# Patient Record
Sex: MALE | Race: BLACK OR AFRICAN AMERICAN
[De-identification: names, ages, dates, MRNs, and addresses within clinical notes are randomized per-mention and may not be internally consistent; named-entity substitution may affect disease eponyms.]

---

## 2017-03-30 ENCOUNTER — HOSPITAL ENCOUNTER (INPATIENT)
Dept: HOSPITAL 61 - ER | Age: 52
LOS: 1 days | Discharge: HOME | DRG: 189 | End: 2017-03-31
Attending: INTERNAL MEDICINE | Admitting: INTERNAL MEDICINE
Payer: SELF-PAY

## 2017-03-30 VITALS — SYSTOLIC BLOOD PRESSURE: 128 MMHG | DIASTOLIC BLOOD PRESSURE: 78 MMHG

## 2017-03-30 VITALS — BODY MASS INDEX: 20.03 KG/M2 | WEIGHT: 143.06 LBS | HEIGHT: 71 IN

## 2017-03-30 VITALS — DIASTOLIC BLOOD PRESSURE: 91 MMHG | SYSTOLIC BLOOD PRESSURE: 129 MMHG

## 2017-03-30 DIAGNOSIS — K21.9: ICD-10-CM

## 2017-03-30 DIAGNOSIS — R07.89: ICD-10-CM

## 2017-03-30 DIAGNOSIS — Z82.49: ICD-10-CM

## 2017-03-30 DIAGNOSIS — J30.9: ICD-10-CM

## 2017-03-30 DIAGNOSIS — I10: ICD-10-CM

## 2017-03-30 DIAGNOSIS — E83.42: ICD-10-CM

## 2017-03-30 DIAGNOSIS — R19.7: ICD-10-CM

## 2017-03-30 DIAGNOSIS — Q89.9: ICD-10-CM

## 2017-03-30 DIAGNOSIS — J96.20: Primary | ICD-10-CM

## 2017-03-30 DIAGNOSIS — F41.9: ICD-10-CM

## 2017-03-30 DIAGNOSIS — K52.9: ICD-10-CM

## 2017-03-30 DIAGNOSIS — F17.200: ICD-10-CM

## 2017-03-30 DIAGNOSIS — M19.90: ICD-10-CM

## 2017-03-30 DIAGNOSIS — E87.6: ICD-10-CM

## 2017-03-30 LAB
ALBUMIN SERPL-MCNC: 3.8 G/DL (ref 3.4–5)
ALP SERPL-CCNC: 96 U/L (ref 46–116)
ALT SERPL-CCNC: 15 U/L (ref 16–63)
ANION GAP SERPL CALC-SCNC: 12 MMOL/L (ref 6–14)
AST SERPL-CCNC: 19 U/L (ref 15–37)
BACTERIA #/AREA URNS HPF: 0 /HPF
BARBITURATES UR-MCNC: (no result) UG/ML
BASOPHILS # BLD AUTO: 0 X10^3/UL (ref 0–0.2)
BASOPHILS NFR BLD: 1 % (ref 0–3)
BENZODIAZ UR-MCNC: (no result) UG/L
BILIRUB DIRECT SERPL-MCNC: 0.1 MG/DL (ref 0–0.2)
BILIRUB SERPL-MCNC: 0.3 MG/DL (ref 0.2–1)
BILIRUB UR QL STRIP: (no result)
BUN SERPL-MCNC: 10 MG/DL (ref 8–26)
CALCIUM SERPL-MCNC: 10 MG/DL (ref 8.5–10.1)
CANNABINOIDS UR-MCNC: (no result) UG/L
CHLORIDE SERPL-SCNC: 101 MMOL/L (ref 98–107)
CK SERPL-CCNC: 78 U/L (ref 39–308)
CKMB INDEX: 0.6 % (ref 0–4)
CKMB MASS: < 0.5 NG/ML (ref 0–3.6)
CO2 SERPL-SCNC: 30 MMOL/L (ref 21–32)
COCAINE UR-MCNC: (no result) NG/ML
CREAT SERPL-MCNC: 1 MG/DL (ref 0.7–1.3)
EOSINOPHIL NFR BLD: 0 % (ref 0–3)
ERYTHROCYTE [DISTWIDTH] IN BLOOD BY AUTOMATED COUNT: 14.6 % (ref 11.5–14.5)
ETHANOL, URINE: (no result)
GFR SERPLBLD BASED ON 1.73 SQ M-ARVRAT: 94.9 ML/MIN
GLUCOSE SERPL-MCNC: 125 MG/DL (ref 70–99)
GLUCOSE UR STRIP-MCNC: NEGATIVE MG/DL
HCT VFR BLD CALC: 48.7 % (ref 39–53)
HGB BLD-MCNC: 16.5 G/DL (ref 13–17.5)
LYMPHOCYTES # BLD: 1 X10^3/UL (ref 1–4.8)
LYMPHOCYTES NFR BLD AUTO: 15 % (ref 24–48)
MAGNESIUM SERPL-MCNC: 1.7 MG/DL (ref 1.8–2.4)
MCH RBC QN AUTO: 32 PG (ref 25–35)
MCHC RBC AUTO-ENTMCNC: 34 G/DL (ref 31–37)
MCV RBC AUTO: 95 FL (ref 79–100)
METHADONE SERPL-MCNC: (no result) NG/ML
MONOCYTES NFR BLD: 7 % (ref 0–9)
NEUTROPHILS NFR BLD AUTO: 77 % (ref 31–73)
NITRITE UR QL STRIP: NEGATIVE
OPIATES UR-MCNC: (no result) NG/ML
PCP SERPL-MCNC: (no result) MG/DL
PH UR STRIP: 6 [PH]
PLATELET # BLD AUTO: 218 X10^3/UL (ref 140–400)
POTASSIUM SERPL-SCNC: 3.5 MMOL/L (ref 3.5–5.1)
PROT SERPL-MCNC: 7.5 G/DL (ref 6.4–8.2)
PROT UR STRIP-MCNC: 30 MG/DL
RBC # BLD AUTO: 5.15 X10^6/UL (ref 4.3–5.7)
RBC #/AREA URNS HPF: 0 /HPF (ref 0–2)
SODIUM SERPL-SCNC: 143 MMOL/L (ref 136–145)
SP GR UR STRIP: >=1.03
SQUAMOUS #/AREA URNS LPF: (no result) /LPF
UROBILINOGEN UR-MCNC: 1 MG/DL
WBC # BLD AUTO: 7 X10^3/UL (ref 4–11)
WBC #/AREA URNS HPF: (no result) /HPF (ref 0–4)

## 2017-03-30 PROCEDURE — 84484 ASSAY OF TROPONIN QUANT: CPT

## 2017-03-30 PROCEDURE — 81001 URINALYSIS AUTO W/SCOPE: CPT

## 2017-03-30 PROCEDURE — 96374 THER/PROPH/DIAG INJ IV PUSH: CPT

## 2017-03-30 PROCEDURE — 80048 BASIC METABOLIC PNL TOTAL CA: CPT

## 2017-03-30 PROCEDURE — 80076 HEPATIC FUNCTION PANEL: CPT

## 2017-03-30 PROCEDURE — 71010: CPT

## 2017-03-30 PROCEDURE — 83690 ASSAY OF LIPASE: CPT

## 2017-03-30 PROCEDURE — 87086 URINE CULTURE/COLONY COUNT: CPT

## 2017-03-30 PROCEDURE — 82553 CREATINE MB FRACTION: CPT

## 2017-03-30 PROCEDURE — 85027 COMPLETE CBC AUTOMATED: CPT

## 2017-03-30 PROCEDURE — 83735 ASSAY OF MAGNESIUM: CPT

## 2017-03-30 PROCEDURE — 96375 TX/PRO/DX INJ NEW DRUG ADDON: CPT

## 2017-03-30 PROCEDURE — 83880 ASSAY OF NATRIURETIC PEPTIDE: CPT

## 2017-03-30 PROCEDURE — 80061 LIPID PANEL: CPT

## 2017-03-30 PROCEDURE — 36415 COLL VENOUS BLD VENIPUNCTURE: CPT

## 2017-03-30 PROCEDURE — 93005 ELECTROCARDIOGRAM TRACING: CPT

## 2017-03-30 RX ADMIN — BACITRACIN SCH MLS/HR: 5000 INJECTION, POWDER, FOR SOLUTION INTRAMUSCULAR at 22:27

## 2017-03-30 NOTE — PHYS DOC
Past Medical History


Past Medical History:  No Pertinent History


Past Surgical History:  Other


Additional Past Surgical Histo:  Inguinal hernia R)


Alcohol Use:  Heavy


Drug Use:  None





Adult General


Chief Complaint


Chief Complaint:  CHEST PAIN





HPI


HPI


Patient is a 52  year old male who presents with complaint of chest pain. 

Patient states he started having intermittent chest pain last night, however he 

started getting constant pain earlier this morning that has persisted 

throughout the day. Patient states that the pain is left-sided, pressure-like, 

and radiates through his chest into his back. Patient has had associated nausea 

with his symptoms. Patient has no known health problems but states that he has 

a greater than 20-pack-year history of smoking. Patient also admits to family 

history of myocardial infarction in his mother. Patient currently rates his 

pain as 10 out of 10. Patient has not taken any medications to help with his 

symptoms at this time. Patient denies any known exacerbating factors, stating 

that "everything makes it hurt." Patient denies any recent cardiac stress 

testing. Patient does not have a primary physician at this time.





Review of Systems


Review of Systems





Constitutional: Denies fever or chills []


Eyes: Denies change in visual acuity, redness, or eye pain []


HENT: Denies nasal congestion or sore throat []


Respiratory: Denies cough or shortness of breath []


Cardiovascular: Chest pain []


GI: Nausea, denies abdominal pain, vomiting, bloody stools or diarrhea []


: Denies dysuria or hematuria []


Musculoskeletal: Denies back pain or joint pain []


Integument: Denies rash or skin lesions []


Neurologic: Denies headache, focal weakness or sensory changes []





Current Medications


Current Medications





 Current Medications








 Medications


  (Trade)  Dose


 Ordered  Sig/Aravind  Start Time


 Stop Time Status Last Admin


Dose Admin


 


 Aspirin


  (Children'S


 Aspirin)  324 mg  1X  ONCE  3/30/17 16:30


 3/30/17 16:31 DC 3/30/17 17:00


324 MG


 


 Fentanyl Citrate


 50 mcg  50 mcg  PRN Q15MIN  PRN  3/30/17 16:30


 3/31/17 16:29  3/30/17 17:04


50 MCG


 


 Nitroglycerin


  (Nitrostat)  0.4 mg  PRN Q5MIN  PRN  3/30/17 16:30


 3/31/17 16:29   


 


 


 Ondansetron HCl


  (Zofran)  4 mg  1X  ONCE  3/30/17 16:30


 3/30/17 16:31 DC 3/30/17 17:02


4 MG


 


 Sodium Chloride


  (Iv Sodium


 Chloride 0.9%


 1000ml Bag)  1,000 ml @ 


 100 mls/hr  Q10H  3/30/17 16:25


 3/31/17 02:24  3/30/17 17:00


100 MLS/HR











Allergies


Allergies





 Allergies








Coded Allergies Type Severity Reaction Last Updated Verified


 


  No Known Drug Allergies    7/8/15 No











Physical Exam


Physical Exam





Constitutional: Alert, afebrile, appears in moderate discomfort. []


HENT: Normocephalic, atraumatic, bilateral external ears normal, oropharynx 

moist, no oral exudates, nose normal. []


Eyes: PERRLA, EOMI, conjunctiva normal, no discharge. [] 


Neck: Normal range of motion, no tenderness, supple, no stridor. [] 


Cardiovascular:Heart rate regular rhythm, no murmur []


Lungs & Thorax:  Bilateral breath sounds clear to auscultation []


Abdomen: Bowel sounds normal, soft, no tenderness, no masses, no pulsatile 

masses. [] 


Skin: Warm, dry, no erythema, no rash. [] 


Back: No tenderness, no CVA tenderness. [] 


Extremities: No tenderness, no cyanosis, no clubbing, ROM intact, no edema. [] 


Neurologic: Alert and oriented X 3, normal motor function, normal sensory 

function, no focal deficits noted. []





Current Patient Data


Vital Signs





 Vital Signs








  Date Time  Temp Pulse Resp B/P Pulse Ox O2 Delivery O2 Flow Rate FiO2


 


3/30/17 17:55  60 24 129/78 97 Nasal Cannula 1 


 


3/30/17 16:10 98.3       





 98.3       








Lab Values





 Laboratory Tests








Test


  3/30/17


16:35 3/30/17


17:25


 


White Blood Count


  7.0x10^3/uL


(4.0-11.0) 


 


 


Red Blood Count


  5.15x10^6/uL


(4.30-5.70) 


 


 


Hemoglobin


  16.5g/dL


(13.0-17.5) 


 


 


Hematocrit


  48.7%


(39.0-53.0) 


 


 


Mean Corpuscular Volume 95fL ()   


 


Mean Corpuscular Hemoglobin 32pg (25-35)   


 


Mean Corpuscular Hemoglobin


Concent 34g/dL (31-37)


  


 


 


Red Cell Distribution Width


  14.6%


(11.5-14.5)  H 


 


 


Platelet Count


  218x10^3/uL


(140-400) 


 


 


Neutrophils (%) (Auto) 77% (31-73)  H 


 


Lymphocytes (%) (Auto) 15% (24-48)  L 


 


Monocytes (%) (Auto) 7% (0-9)   


 


Eosinophils (%) (Auto) 0% (0-3)   


 


Basophils (%) (Auto) 1% (0-3)   


 


Neutrophils # (Auto)


  5.3x10^3uL


(1.8-7.7) 


 


 


Lymphocytes # (Auto)


  1.0x10^3/uL


(1.0-4.8) 


 


 


Monocytes # (Auto)


  0.5x10^3/uL


(0.0-1.1) 


 


 


Eosinophils # (Auto)


  0.0x10^3/uL


(0.0-0.7) 


 


 


Basophils # (Auto)


  0.0x10^3/uL


(0.0-0.2) 


 


 


Sodium Level


  143mmol/L


(136-145) 


 


 


Potassium Level


  3.5mmol/L


(3.5-5.1) 


 


 


Chloride Level


  101mmol/L


() 


 


 


Carbon Dioxide Level


  30mmol/L


(21-32) 


 


 


Anion Gap 12 (6-14)   


 


Blood Urea Nitrogen


  10mg/dL (8-26)


  


 


 


Creatinine


  1.0mg/dL


(0.7-1.3) 


 


 


Estimated GFR


(Cockcroft-Gault) 94.9  


  


 


 


Glucose Level


  125mg/dL


(70-99)  H 


 


 


Calcium Level


  10.0mg/dL


(8.5-10.1) 


 


 


Magnesium Level


  1.7mg/dL


(1.8-2.4)  L 


 


 


Total Bilirubin


  0.3mg/dL


(0.2-1.0) 


 


 


Direct Bilirubin


  0.1mg/dL


(0.0-0.2) 


 


 


Aspartate Amino Transferase


(AST) 19U/L (15-37)  


  


 


 


Alanine Aminotransferase (ALT)


  15U/L (16-63)


L 


 


 


Alkaline Phosphatase


  96U/L ()


  


 


 


Creatine Kinase


  78U/L ()


  


 


 


Creatine Kinase MB (Mass)


  < 0.5ng/mL


(0.0-3.6) 


 


 


Creatine Kinase MB Relative


Index 0.6% (0-4)  


  


 


 


Troponin I Quantitative


  < 0.017ng/mL


(0.000-0.055) 


 


 


NT-Pro-B-Type Natriuretic


Peptide 91pg/mL


(0-124) 


 


 


Total Protein


  7.5g/dL


(6.4-8.2) 


 


 


Albumin


  3.8g/dL


(3.4-5.0) 


 


 


Lipase


  77U/L ()


  


 


 


Urine Collection Type  Unknown  


 


Urine Color  Love  


 


Urine Clarity  Clear  


 


Urine pH  6.0  


 


Urine Specific Gravity  >=1.030  


 


Urine Protein


  


  30mg/dL


(NEG-TRACE)


 


Urine Glucose (UA)


  


  Negativemg/dL


(NEG)


 


Urine Ketones (Stick)  15mg/dL (NEG)  


 


Urine Blood


  


  Negative (NEG)


 


 


Urine Nitrite


  


  Negative (NEG)


 


 


Urine Bilirubin  Small (NEG)  


 


Urine Urobilinogen Dipstick


  


  1.0mg/dL (0.2


mg/dL)


 


Urine Leukocyte Esterase  Small (NEG)  


 


Urine RBC  0/HPF (0-2)  


 


Urine WBC  1-4/HPF (0-4)  


 


Urine Squamous Epithelial


Cells 


  Occ/LPF  


 


 


Urine Bacteria  0/HPF (0-FEW)  


 


Urine Mucus  Marked/LPF  


 


Urine Opiates Screen  Pos (NEG)  


 


Urine Methadone Screen  Neg (NEG)  


 


Urine Barbiturates  Neg (NEG)  


 


Urine Phencyclidine Screen  Neg (NEG)  


 


Urine


Amphetamine/Methamphetamine 


  Neg (NEG)  


 


 


Urine Benzodiazepines Screen  Neg (NEG)  


 


Urine Cocaine Screen  Neg (NEG)  


 


Urine Cannabinoids Screen  Pos (NEG)  


 


Urine Ethyl Alcohol  Neg (NEG)  





 Laboratory Tests


3/30/17 16:35








 Laboratory Tests


3/30/17 16:35











EKG


EKG


Interpreted by me: Heart rate 70, sinus rhythm, rightward axis, no acute ST/T-

wave abnormalities present []





Radiology/Procedures


Radiology/Procedures





 [facility]


 [address] [place] [address]


 [phone]


 


 IMAGING REPORT





 Signed





PATIENT: DAX CURTIS ACCOUNT: [id number] MRN#: [id number]


: 1965 LOCATION: ER AGE: 52


SEX: M EXAM DT: 17 ACCESSION#: [id number]


STATUS: PRE ER ORD. PHYSICIAN: SHIVANI VELASCO MD 


REASON: chest pain


PROCEDURE: PORTABLE CHEST 1V











Portable chest, 3/30/2017:





History: Chest pain





The heart size and pulmonary vascularity are normal. Small nodular opacities


projected over the lower chest are most likely nipple shadows. No pulmonary


infiltrates are seen. There is no evidence of pleural fluid.





IMPRESSION: No acute cardiopulmonary abnormality is detected.














DICTATED and SIGNED BY:     MORITZ,RICK S MD


DATE:     17 8659





CC: SHIVANI VELASCO MD; NO PCP ~


[]





Course & Med Decision Making


Course & Med Decision Making


Pertinent Labs and Imaging studies reviewed. (See chart for details)





The patient was treated with aspirin and fentanyl in the emergency department. 

Patient's symptoms have improved with treatment. The patient has risk factors 

for coronary artery disease and patient does not have adequate follow-up. For 

this reason the patient will need to be admitted for rule out of myocardial 

infarction. I spoke with Dr. Harris who accepted care patient in hospital. A 

consult was placed to Dr. Zepeda of cardiology to follow with patient in 

hospital.





Dragon Disclaimer


Dragon Disclaimer


This electronic medical record was generated, in whole or in part, using a 

voice recognition dictation system.





Departure


Departure


Impression:  


 Primary Impression:  


 Chest pain


 Additional Impressions:  


 Tobacco use


 Family history of myocardial infarction


Disposition:   ADMITTED AS INPATIENT


Admitting Physician:  Héctor Harris


Condition:  STABLE


Referrals:  


NO PCP (PCP)





Problem Qualifiers








 Primary Impression:  


 Chest pain


 Chest pain type:  unspecified  Qualified Code:  R07.9 - Chest pain, unspecified





SHIVAIN VELASCO MD Mar 30, 2017 16:38

## 2017-03-30 NOTE — RAD
Portable chest, 3/30/2017:



History: Chest pain



The heart size and pulmonary vascularity are normal. Small nodular opacities

projected over the lower chest are most likely nipple shadows. No pulmonary

infiltrates are seen. There is no evidence of pleural fluid.



IMPRESSION: No acute cardiopulmonary abnormality is detected.

## 2017-03-31 VITALS — DIASTOLIC BLOOD PRESSURE: 78 MMHG | SYSTOLIC BLOOD PRESSURE: 130 MMHG

## 2017-03-31 VITALS — SYSTOLIC BLOOD PRESSURE: 113 MMHG | DIASTOLIC BLOOD PRESSURE: 73 MMHG

## 2017-03-31 VITALS — DIASTOLIC BLOOD PRESSURE: 69 MMHG | SYSTOLIC BLOOD PRESSURE: 123 MMHG

## 2017-03-31 LAB
ANION GAP SERPL CALC-SCNC: 14 MMOL/L (ref 6–14)
BASOPHILS # BLD AUTO: 0 X10^3/UL (ref 0–0.2)
BASOPHILS NFR BLD: 1 % (ref 0–3)
BUN SERPL-MCNC: 10 MG/DL (ref 8–26)
CALCIUM SERPL-MCNC: 9.1 MG/DL (ref 8.5–10.1)
CHLORIDE SERPL-SCNC: 103 MMOL/L (ref 98–107)
CHOLEST SERPL-MCNC: 170 MG/DL (ref 0–200)
CHOLEST/HDLC SERPL: 3.3 {RATIO}
CO2 SERPL-SCNC: 26 MMOL/L (ref 21–32)
CREAT SERPL-MCNC: 0.9 MG/DL (ref 0.7–1.3)
EOSINOPHIL NFR BLD: 1 % (ref 0–3)
ERYTHROCYTE [DISTWIDTH] IN BLOOD BY AUTOMATED COUNT: 15 % (ref 11.5–14.5)
GFR SERPLBLD BASED ON 1.73 SQ M-ARVRAT: 107.2 ML/MIN
GLUCOSE SERPL-MCNC: 104 MG/DL (ref 70–99)
HCT VFR BLD CALC: 45.3 % (ref 39–53)
HDLC SERPL-MCNC: 51 MG/DL (ref 40–60)
HGB BLD-MCNC: 14.9 G/DL (ref 13–17.5)
LYMPHOCYTES # BLD: 1.8 X10^3/UL (ref 1–4.8)
LYMPHOCYTES NFR BLD AUTO: 33 % (ref 24–48)
MAGNESIUM SERPL-MCNC: 1.9 MG/DL (ref 1.8–2.4)
MCH RBC QN AUTO: 32 PG (ref 25–35)
MCHC RBC AUTO-ENTMCNC: 33 G/DL (ref 31–37)
MCV RBC AUTO: 97 FL (ref 79–100)
MONOCYTES NFR BLD: 9 % (ref 0–9)
NEUTROPHILS NFR BLD AUTO: 57 % (ref 31–73)
PLATELET # BLD AUTO: 196 X10^3/UL (ref 140–400)
POTASSIUM SERPL-SCNC: 3.1 MMOL/L (ref 3.5–5.1)
RBC # BLD AUTO: 4.68 X10^6/UL (ref 4.3–5.7)
SODIUM SERPL-SCNC: 143 MMOL/L (ref 136–145)
TRIGL SERPL-MCNC: 116 MG/DL (ref 0–150)
WBC # BLD AUTO: 5.4 X10^3/UL (ref 4–11)

## 2017-03-31 RX ADMIN — POTASSIUM CHLORIDE SCH MEQ: 1500 TABLET, EXTENDED RELEASE ORAL at 12:00

## 2017-03-31 RX ADMIN — BACITRACIN SCH MLS/HR: 5000 INJECTION, POWDER, FOR SOLUTION INTRAMUSCULAR at 03:02

## 2017-03-31 NOTE — PDOC2
PARTH CAMACHO APRN 3/31/17 0903:


CARDIAC CONSULT


DATE OF CONSULT


Date of Consult


DATE: 3/31/17 


TIME: 08:55





REASON FOR CONSULT


Reason for Consult:


Chest pain





REFERRING PHYSICIAN


Referring Physician:


Rena





SOURCE


Source:  Chart review, Patient





HISTORY OF PRESENT ILLNESS


HISTORY OF PRESENT ILLNESS


This is a 51 yo male admitted for complains of chest pain.  Reports as sharp 

then pressure to right chest that radiated to mid back.  This started 2 days 

ago 8PM which then he felt slightly nauseated as well and slept it off.  The 

next day he was doing well and decided to lift about 100 lb box and felt chest 

discomfort and was nauseated as well. His nausea resolved but CP was 

intermittent throughout the day. He then started having diarrhea, 3 bouts of 

diarrhea yesterday 1 was watery then x2 loose. Denies any fever or chills.  

There was no diaphoresis, palpitations, dizziness, SOA nor JAIME. No further 

recurrence of his symptoms since in ED. Also no recent falls or injury he also 

has been having almost a daily heartburn which he does not take any medications 

for it.  No daily NSAID use.  He does take sinus medications which has sudafed 

in it almost everyday for his allergies are bad. Denies any CAD, VTE in the 

past.





PAST MEDICAL HISTORY


Cardiovascular:  No pertinent hx


Pulmonary:  No pertinent hx


CENTRAL NERVOUS SYSTEM:  Other (No pertinent history)


GI:  No pertinent hx


Heme/Onc:  No pertinent hx


Hepatobiliary:  No pertinent hx


Psych:  No pertinent hx


Musculoskeletal:  Osteoarthritis


Rheumatologic:  No pertinent hx


Infectious disease:  No pertinent hx


ENT:  Allergic Rhinitis


Renal/:  No pertinent hx


Endocrine:  No pertinent hx


Dermatology:  No pertinent hx





PAST SURGICAL HISTORY


Past Surgical History:  Other (left inguinal hernia repair as a child)





FAMILY HISTORY


Family History:  Coronary Artery Disease (mother CAD in her 30s)





SOCIAL HISTORY


Smoke:  1 pack per day (>20)


ALCOHOL:  occassional


Drugs:  Marijuana


Lives:  with Family





CURRENT MEDICATIONS


CURRENT MEDICATIONS





Current Medications








 Medications


  (Trade)  Dose


 Ordered  Sig/Aravind


 Route


 PRN Reason  Start Time


 Stop Time Status Last Admin


Dose Admin


 


 Aspirin


  (Children'S


 Aspirin)  324 mg  1X  ONCE


 PO


   3/30/17 16:30


 3/30/17 16:31 DC 3/30/17 17:00


 


 


 Fentanyl Citrate


 50 mcg  50 mcg  PRN Q15MIN  PRN


 IV


 PAIN GREATER THAN 3/10  3/30/17 16:30


 3/31/17 16:29  3/30/17 17:04


 


 


 Sodium Chloride


  (Iv Sodium


 Chloride 0.9%


 1000ml Bag)  1,000 ml @ 


 100 mls/hr  Q10H


 IV


   3/30/17 16:25


 3/31/17 02:24 DC 3/30/17 17:00


 


 


 Ondansetron HCl 4


 mg  4 mg  1X  ONCE


 IV


   3/30/17 16:30


 3/30/17 16:31 DC 3/30/17 17:02


 


 


 Sodium Chloride


  (Iv Sodium


 Chloride 0.9%


 1000ml Bag)  1,000 ml @ 


 100 mls/hr  Q10H


 IV


   3/30/17 19:01


 3/31/17 19:00  3/31/17 03:02


 


 


 Morphine Sulfate  2 mg  PRN Q2HR  PRN


 IV


 SEVERE PAIN  3/30/17 22:30


    3/30/17 23:19


 











ALLERGIES


ALLERGIES:  


Coded Allergies:  


     No Known Drug Allergies (Unverified , 7/8/15)





ROS


Review of System


14 point ROS evaluated with pertinent positives noted per HPI





PHYSICAL EXAM


General:  Alert, Oriented X3, Cooperative, No acute distress


HEENT:  Atraumatic, Mucous membr. moist/pink


Lungs:  Clear to auscultation, Normal air movement


Heart:  Regular rate, Normal S1, Normal S2, Other (2/6 systolic murmur to LLS 

border)


Abdomen:  Soft, No tenderness


Extremities:  No cyanosis, No edema


Skin:  No breakdown, No significant lesion


Neuro:  Normal speech, Sensation intact


Psych/Mental Status:  Mental status NL, Mood NL


MUSCULOSKELETAL:  Osteoarthritic changes both hands





VITALS


VITALS





 Vital Signs








  Date Time  Temp Pulse Resp B/P Pulse Ox O2 Delivery O2 Flow Rate FiO2


 


3/31/17 07:00 98.3 63 20 113/73 91 Room Air  





 98.3       


 


3/30/17 23:19       1.0 











LABS


Lab:





Laboratory Tests








Test


  3/30/17


16:35 3/30/17


17:25 3/31/17


01:30 3/31/17


05:30


 


White Blood Count


  7.0x10^3/uL


(4.0-11.0) 


  


  5.4x10^3/uL


(4.0-11.0)


 


Red Blood Count


  5.15x10^6/uL


(4.30-5.70) 


  


  4.68x10^6/uL


(4.30-5.70)


 


Hemoglobin


  16.5g/dL


(13.0-17.5) 


  


  14.9g/dL


(13.0-17.5)


 


Hematocrit


  48.7%


(39.0-53.0) 


  


  45.3%


(39.0-53.0)


 


Mean Corpuscular Volume 95fL ()    97fL () 


 


Mean Corpuscular Hemoglobin 32pg (25-35)    32pg (25-35) 


 


Mean Corpuscular Hemoglobin


Concent 34g/dL (31-37) 


  


  


  33g/dL (31-37) 


 


 


Red Cell Distribution Width


  14.6%


(11.5-14.5) 


  


  15.0%


(11.5-14.5)


 


Platelet Count


  218x10^3/uL


(140-400) 


  


  196x10^3/uL


(140-400)


 


Neutrophils (%) (Auto) 77% (31-73)    57% (31-73) 


 


Lymphocytes (%) (Auto) 15% (24-48)    33% (24-48) 


 


Monocytes (%) (Auto) 7% (0-9)    9% (0-9) 


 


Eosinophils (%) (Auto) 0% (0-3)    1% (0-3) 


 


Basophils (%) (Auto) 1% (0-3)    1% (0-3) 


 


Neutrophils # (Auto)


  5.3x10^3uL


(1.8-7.7) 


  


  3.0x10^3uL


(1.8-7.7)


 


Lymphocytes # (Auto)


  1.0x10^3/uL


(1.0-4.8) 


  


  1.8x10^3/uL


(1.0-4.8)


 


Monocytes # (Auto)


  0.5x10^3/uL


(0.0-1.1) 


  


  0.5x10^3/uL


(0.0-1.1)


 


Eosinophils # (Auto)


  0.0x10^3/uL


(0.0-0.7) 


  


  0.1x10^3/uL


(0.0-0.7)


 


Basophils # (Auto)


  0.0x10^3/uL


(0.0-0.2) 


  


  0.0x10^3/uL


(0.0-0.2)


 


Sodium Level


  143mmol/L


(136-145) 


  


  143mmol/L


(136-145)


 


Potassium Level


  3.5mmol/L


(3.5-5.1) 


  


  3.1mmol/L


(3.5-5.1)


 


Chloride Level


  101mmol/L


() 


  


  103mmol/L


()


 


Carbon Dioxide Level


  30mmol/L


(21-32) 


  


  26mmol/L


(21-32)


 


Anion Gap 12 (6-14)    14 (6-14) 


 


Blood Urea Nitrogen 10mg/dL (8-26)    10mg/dL (8-26) 


 


Creatinine


  1.0mg/dL


(0.7-1.3) 


  


  0.9mg/dL


(0.7-1.3)


 


Estimated GFR


(Cockcroft-Gault) 94.9 


  


  


  107.2 


 


 


Glucose Level


  125mg/dL


(70-99) 


  


  104mg/dL


(70-99)


 


Calcium Level


  10.0mg/dL


(8.5-10.1) 


  


  9.1mg/dL


(8.5-10.1)


 


Magnesium Level


  1.7mg/dL


(1.8-2.4) 


  


  


 


 


Total Bilirubin


  0.3mg/dL


(0.2-1.0) 


  


  


 


 


Direct Bilirubin


  0.1mg/dL


(0.0-0.2) 


  


  


 


 


Aspartate Amino Transf


(AST/SGOT) 19U/L (15-37) 


  


  


  


 


 


Alanine Aminotransferase


(ALT/SGPT) 15U/L (16-63) 


  


  


  


 


 


Alkaline Phosphatase 96U/L ()    


 


Creatine Kinase 78U/L ()    


 


Creatine Kinase MB (Mass)


  < 0.5ng/mL


(0.0-3.6) 


  


  


 


 


Creatine Kinase MB Relative


Index 0.6% (0-4) 


  


  


  


 


 


Troponin I Quantitative


  < 0.017ng/mL


(0.000-0.055) 


  < 0.017ng/mL


(0.000-0.055) 


 


 


NT-Pro-B-Type Natriuretic


Peptide 91pg/mL


(0-124) 


  


  


 


 


Total Protein


  7.5g/dL


(6.4-8.2) 


  


  


 


 


Albumin


  3.8g/dL


(3.4-5.0) 


  


  


 


 


Lipase 77U/L ()    


 


Urine Collection Type  Unknown   


 


Urine Color  Love   


 


Urine Clarity  Clear   


 


Urine pH  6.0   


 


Urine Specific Gravity  >=1.030   


 


Urine Protein


  


  30mg/dL


(NEG-TRACE) 


  


 


 


Urine Glucose (UA)


  


  Negativemg/dL


(NEG) 


  


 


 


Urine Ketones (Stick)  15mg/dL (NEG)   


 


Urine Blood  Negative (NEG)   


 


Urine Nitrite  Negative (NEG)   


 


Urine Bilirubin  Small (NEG)   


 


Urine Urobilinogen Dipstick


  


  1.0mg/dL (0.2


mg/dL) 


  


 


 


Urine Leukocyte Esterase  Small (NEG)   


 


Urine RBC  0/HPF (0-2)   


 


Urine WBC  1-4/HPF (0-4)   


 


Urine Squamous Epithelial


Cells 


  Occ/LPF 


  


  


 


 


Urine Bacteria  0/HPF (0-FEW)   


 


Urine Mucus  Marked/LPF   


 


Urine Opiates Screen  Pos (NEG)   


 


Urine Methadone Screen  Neg (NEG)   


 


Urine Barbiturates  Neg (NEG)   


 


Urine Phencyclidine Screen  Neg (NEG)   


 


Urine


Amphetamine/Methamphetamine 


  Neg (NEG) 


  


  


 


 


Urine Benzodiazepines Screen  Neg (NEG)   


 


Urine Cocaine Screen  Neg (NEG)   


 


Urine Cannabinoids Screen  Pos (NEG)   


 


Urine Ethyl Alcohol  Neg (NEG)   














Test


  3/31/17


06:40 


  


  


 


 


Troponin I Quantitative


  < 0.017ng/mL


(0.000-0.055) 


  


  


 











ASSESSMENT/PLAN


ASSESSMENT/PLAN


1. Atypical chest pain: doubt ACS. Suspect GI with MSK element. 


2. Possible gastroenteritis vs GB anomaly: nausea and diarrhea, defer to PCP


3. Hypokalemia related to diarrhea, hypomagnesemia resolved. 


4. GERD exacerbation: almost daily heartburn per pt. 


5. Allergic rhinitis: daily use of Sudafed


6. Tobaccoism


7. Substance abuse: positive for opioids and marijuana





Recommendations


1. TTE today, if no significant changes then no further testing


2. Lipids, Replace K. Recommend GB sono defer to PCP. 


3. Smoking cessation, substance abstinence


4. Discouraged daily sudafed, will likely need nasal spray, defer to PCP


5. Start on PPI.


Problems:  





LAYO CALABRESE MD 3/31/17 1316:


CARDIAC CONSULT


ALLERGIES


ALLERGIES:  


Coded Allergies:  


     No Known Drug Allergies (Unverified , 7/8/15)





ASSESSMENT/PLAN


ASSESSMENT/PLAN


Patient seen and examined. Agree with NP's assessment and plan. Chest pain very 

atypical, reproducible to palpation and most probably musculoskeletal. 

Myocardial infarction ruled out. Check 2-D echo to assess LV function and rule 

out wall motion abnormalities. Thank you for consultation.


Problems:  








PARTH CAMACHO Mar 31, 2017 09:03


LAYO CALABRESE MD Mar 31, 2017 13:16

## 2017-03-31 NOTE — EKG
[facility]

               [address] [place] [address]

Test Date:    2017               Test Time:    16:21:23

Pat Name:     DAX CURTIS             Department:   

Patient ID:   [id number]           Room:         569 1

Gender:       M                        Technician:   

:          1965               Requested By: SHIVANI VELASCO

Order Number: [id number]            Reading MD:   Irma Iniguez

                                 Measurements

Intervals                              Axis          

Rate:         70                       P:            -40

SD:           124                      QRS:          101

QRSD:         78                       T:            85

QT:           398                                    

QTc:          433                                    

                           Interpretive Statements

SINUS RHYTHM

RIGHTWARD AXIS

LOW LIMB LEAD VOLTAGE

QRS(T) CONTOUR ABNORMALITY

CONSISTENT WITH HIGH LATERAL INFARCT

PROBABLY OLD

ABNORMAL ECG

RI6.01

No previous ECG available for comparison



Electronically Signed On 2017 18:46:59 CDT by Irma Iniguez

## 2017-03-31 NOTE — HP
ADMIT DATE:  03/30/2017



CHIEF COMPLAINT:  Chest pain.



HISTORY OF PRESENT ILLNESS:  The patient is a pleasant middle-aged

-American male who works as a .  He states he does not have

any coronary problems that he knows of.  He presents with chest pain that has

been occurring for several days.  He rates it at 7/10.  He has associated

anxiety.  Discussed the case with ER physician.  The patient does have a couple

of risk factors.  We are going to admit him and rule out coronary artery

disease.



PAST MEDICAL HISTORY:  Tobacco abuse, arthritis, hypertension.



ALLERGIES:  None.



FAMILY HISTORY:  Hypertension and coronary artery disease in his mother.



SOCIAL HISTORY:  He does smoke.  No drinking or drugs.  He works as a

.



MEDICATIONS:  Reviewed, please refer to the MRAD.



REVIEW OF SYSTEMS:

GENERAL:  No history of weight change, weakness or fevers.

SKIN:  No bruising, hair changes or rashes.

EYES:  No blurred, double or loss of vision.

NOSE AND THROAT:  No history of nosebleeds, hoarseness or sore throat.

HEART:  No history of palpitations or shortness of breath on exertion.  He

complains of chest pain.

LUNGS:  Denies cough, hemoptysis, wheezing or shortness of breath.

GASTROINTESTINAL:  Denies changes in appetite, nausea, vomiting, diarrhea or

constipation.

GENITOURINARY:  No history of frequency, urgency, hesitancy or nocturia.

NEUROLOGIC:  Denies history of numbness, tingling, tremor or weakness.

PSYCHIATRIC:  No history of panic, anxiety or depression.

ENDOCRINE:  No history of heat or cold intolerance, polyuria or polydipsia.

EXTREMITIES:  Denies muscle weakness, joint pain, pain on walking or stiffness.

 

PHYSICAL EXAMINATION:

VITAL SIGNS:  Stable.  Temperature is afebrile, pulse 63, respirations 20, blood

pressure 113/80.

GENERAL:  He is alert, cooperative.

HEART:  Normal S1, S2.

LUNGS:  Clear.

ABDOMEN:  Soft, positive bowel sounds.

EXTREMITIES:  No edema.

SKIN:  No rashes.

PSYCHIATRIC:  He is anxious.

VASCULAR:  Good capillary refill.

ENDOCRINE:  No thyromegaly.

LYMPHATICS:  No cervical nodes.

HEMATOPOIETIC:  No bruising.



LABORATORY DATA:  Troponin is 0.  EKG shows sinus rhythm.



ASSESSMENT AND PLAN:  Chest pain, rule out coronary artery disease.  We will

check serial enzymes, serial EKGs.  Consult Dr. Katrapati, suspect he may need a

stress test.  I told him to quit smoking.  Resume home medicines.

 



______________________________

LALA VÁSQUEZ DO



DR:  IVORY/ariadna  JOB#:  [id number] / [id number]

DD:  03/30/2017 23:33  DT:  03/31/2017 00:07

## 2019-04-15 ENCOUNTER — HOSPITAL ENCOUNTER (EMERGENCY)
Dept: HOSPITAL 61 - ER | Age: 54
Discharge: HOME | End: 2019-04-15
Payer: COMMERCIAL

## 2019-04-15 VITALS — SYSTOLIC BLOOD PRESSURE: 155 MMHG | DIASTOLIC BLOOD PRESSURE: 87 MMHG

## 2019-04-15 VITALS — HEIGHT: 71 IN | BODY MASS INDEX: 22.4 KG/M2 | WEIGHT: 160 LBS
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
## 2019-04-15 DIAGNOSIS — S80.11XA: ICD-10-CM
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
## 2019-04-15 DIAGNOSIS — S39.012A: Primary | ICD-10-CM

## 2019-04-15 DIAGNOSIS — F17.210: ICD-10-CM
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
## 2019-04-15 PROCEDURE — 99284 EMERGENCY DEPT VISIT MOD MDM: CPT

## 2019-04-15 PROCEDURE — 73590 X-RAY EXAM OF LOWER LEG: CPT

## 2019-04-15 PROCEDURE — 72100 X-RAY EXAM L-S SPINE 2/3 VWS: CPT

## 2019-04-15 PROCEDURE — 96372 THER/PROPH/DIAG INJ SC/IM: CPT

## 2019-04-15 NOTE — PHYS DOC
Past Medical History


Past Medical History:  No Pertinent History


Past Surgical History:  Other


Additional Past Surgical Histo:  Inguinal hernia R)


Smoking:  Cigarettes


Alcohol Use:  Occasionally


Drug Use:  Marijuana





Adult General


Chief Complaint


Chief Complaint:  BACK PAIN OR INJURY





HPI


HPI





Patient is a 54  year old male who presents with living of a fall and injury to 

back and right leg. Patient states he had a fall from a dock about 5 feet high 

and twisted his back and his right leg between his truck and dock 4 days ago 

without loss of consciousness and other injuries. Patient complaining of 

constant pain in his right lower extremity and intermittent episodes of pain in 

right lower back that getting force with movement. Patient rated his pain 8/10 

and states he took 600 mg ibuprofen with partial improvement of pain. Patient 

is up-to-date with tetanus immunization.





Review of Systems


Review of Systems





Constitutional: Denies fever or chills []


Eyes: Denies change in visual acuity, redness, or eye pain []


HENT: Denies nasal congestion or sore throat []


Respiratory: Denies cough or shortness of breath []


Cardiovascular: No additional information not addressed in HPI []


GI: Denies abdominal pain, nausea, vomiting, bloody stools or diarrhea []


: Denies dysuria or hematuria []


Musculoskeletal: Reports back pain and joint pain


Integument: Denies rash or skin lesions []


Neurologic: Denies headache, focal weakness or sensory changes []


Endocrine: Denies polyuria or polydipsia []





All other systems were reviewed and found to be within normal limits, except as 

documented in this note.





Current Medications


Current Medications





Current Medications








 Medications


  (Trade)  Dose


 [place]  [facility] [place]  Start Time


 Stop Time Status Last Admin


Dose Admin


 


 Ketorolac


 Tromethamine


  (Toradol Im)  60 mg  1X  ONCE  4/15/19 07:45


 4/15/19 07:46 DC 4/15/19 07:48


60 MG











Allergies


Allergies





Allergies








Coded Allergies Type Severity Reaction Last Updated Verified


 


  No Known Drug Allergies    7/8/15 No











Physical Exam


Physical Exam





Constitutional: Well developed, well nourished, mild distress, non-toxic 

appearance. []


HENT: Normocephalic, atraumatic.


Eyes: PERRLA, EOMI, conjunctiva normal, no discharge. [] 


Neck: Normal range of motion, no tenderness, supple, no stridor. [] 


Cardiovascular:Heart rate regular rhythm, no murmur []


Lungs & Thorax:  Bilateral breath sounds clear to auscultation []


Skin: Warm, dry, no erythema, no rash. [] 


Back: No midline tenderness, no contusion or deformity, mild muscle tenderness 

in right lower paraspinal area.


Extremities: Right lower extremity with abrasions in anterior of the without 

laceration or deformity with mild tenderness, no cyanosis, no clubbing, ROM 

intact, no edema. [] 


Neurologic: Alert and oriented X 3, normal motor function, normal sensory 

function, no focal deficits noted. []


Psychologic: Affect normal, judgement normal, mood normal. []





Current Patient Data


Vital Signs





 Vital Signs








  Date Time  Temp Pulse Resp B/P (MAP) Pulse Ox O2 Delivery O2 Flow Rate FiO2


 


4/15/19 07:28 97.7 94 16 155/87 (109) 97 Room Air  





 97.7       











EKG


EKG


[]





Radiology/Procedures


Radiology/Procedures


[][address] [id number] [phone]


 


 IMAGING REPORT





 Signed





PATIENT: DAX CURTIS ACCOUNT: [id number] MRN#: [id number]


: 1965 LOCATION: ER AGE: 54


SEX: M EXAM DT: 04/15/19 ACCESSION#: [id number]


STATUS: REG ER ORD. PHYSICIAN: JAKUB EDMONDS MD 


REASON: fall


PROCEDURE: LUMBAR SPINE 2-3V





Examination: LUMBAR SPINE 2-3V


 


History: PT FELL ON 19, PAIN TO LOW BACK


 


Comparison/Correlation: None


 


Findings: Total 3 images of the muscular obtained. This includes frontal, 


lateral, and cone-down L5-S1 lateral view. Alignment is normal. Vertebral 


body heights are adequate. Minimal spurring is noted involving the low 


thoracic and upper lumbar spine. No acute fracture or bony destruction. 


Mild facet joint degenerative changes of the low lumbar spine are present.


Minimal levo convexity of the lumbar spine.


 


 


Impression:


No suspicious process.


 


Electronically signed by: Zeus Clark MD (4/15/2019 8:07 AM) [id number]














DICTATED and SIGNED BY:     ZEUS CLARK MD


DATE:     04/15/19 [address] [id number] [phone]


 


 IMAGING REPORT





 Signed





PATIENT: DAX CURTIS ACCOUNT: [id number] MRN#: [id number]


: 1965 LOCATION: ER AGE: 54


SEX: M EXAM DT: 04/15/19 ACCESSION#: [id number]


STATUS: REG ER ORD. PHYSICIAN: JAKUB EDMONDS MD 


REASON: fall


PROCEDURE: TIBIA FIBULA RIGHT





Examination: TIBIA FIBULA RIGHT


 


History: PT FELL ON 19, PAIN TO RT TIB-FIB


 


Comparison/Correlation: None


 


Findings: Frontal and lateral views of the right tibia and fibula were 


obtained. Joint spaces are unremarkable. No acute fracture or bony 


destruction. Soft tissues are unremarkable.


 


 


Impression:


No acute process.


 


Electronically signed by: Zeus Clark MD (4/15/2019 8:06 AM) [id number]














DICTATED and SIGNED BY:     ZEUS CLARK MD


DATE:     04/15/19 0806





Course & Med Decision Making


Course & Med Decision Making


Pertinent  Imaging studies reviewed. (See chart for details)





discharge:





I've spoken with the patient and/or caregivers. I've explained the patient's 

condition, diagnosis and treatment plan based on information available to me at 

this time. I've answered the patient's and/or caregivers questions and 

addressed any concerns. The patient and/or caregivers have a good understanding 

the patient's diagnosis, condition and treatment plan as can be expected at 

this point. Vital signs have been stabilized. The patient's condition is stable 

for discharge from the emergency department.





The patient will pursue further outpatient evaluation with her primary care 

provider or other designated consulting physician as outlined in the discharge 

instructions. Patient and/or caregivers are agreeable to this plan of care and 

follow-up instructions have been explained in detail. The patient and/or 

caregivers have received these instructions in written format and expressed 

understanding of these discharge instructions. The patient and her caregivers 

are aware that if any significant change in condition or worsening of symptoms 

should prompt him to immediately return to this of the closest emergency 

department.  If an emergent department is not readily available I would 

encourage him to call 911.





Jefe Disclaimer


Dragon Disclaimer


This electronic medical record was generated, in whole or in part, using a 

voice recognition dictation system.





Departure


Departure


Impression:  


 Primary Impression:  


 Contusion of right lower extremity


 Additional Impressions:  


 Acute lumbosacral myofascial strain


 Fall from height of greater than 3 feet


Disposition:  01 HOME, SELF-CARE (at 844)


Condition:  IMPROVED


Referrals:  


NO PCP (PCP)


Patient Instructions:  Abrasions, Contusion, Lumbosacral Strain





Additional Instructions:  


Drink plenty of liquids


Follow-up with your primary care physician in 3-5 days


Return to ER if not getting better


Apply ice on the affected area


Scripts


Tramadol Hcl (ULTRAM) 50 Mg Tablet


50 MG PO Q6HRS PRN for PAIN, #14 TAB 0 Refills


   Prov: JAKUB EDMONDS MD         4/15/19 


Cyclobenzaprine Hcl (CYCLOBENZAPRINE HCL) 10 Mg Tablet


1 TAB PO TID for muscle pain, #30 TAB


   Prov: JAKUB EDMONDS MD         4/15/19





Problem Qualifiers








 Primary Impression:  


 Contusion of right lower extremity


 Encounter type:  initial encounter  Qualified Codes:  S80.11XA - Contusion of 

right lower leg, initial encounter


 Additional Impressions:  


 Acute lumbosacral myofascial strain


 Encounter type:  initial encounter  Qualified Codes:  S39.012A - Strain of 

muscle, fascia and tendon of lower back, initial encounter








JAKUB EDMONDS MD Apr 15, 2019 07:40

## 2019-04-15 NOTE — RAD
Examination: LUMBAR SPINE 2-3V

 

History: PT FELL ON THURSDAY 4/11/19, PAIN TO LOW BACK

 

Comparison/Correlation: None

 

Findings: Total 3 images of the muscular obtained. This includes frontal, 

lateral, and cone-down L5-S1 lateral view. Alignment is normal. Vertebral 

body heights are adequate. Minimal spurring is noted involving the low 

thoracic and upper lumbar spine. No acute fracture or bony destruction. 

Mild facet joint degenerative changes of the low lumbar spine are present.

Minimal levo convexity of the lumbar spine.

 

 

Impression:

No suspicious process.

 

Electronically signed by: Zeus López MD (4/15/2019 8:07 AM) [id number]

## 2019-04-15 NOTE — RAD
Examination: TIBIA FIBULA RIGHT

 

History: PT FELL ON THURSDAY 4/11/19, PAIN TO RT TIB-FIB

 

Comparison/Correlation: None

 

Findings: Frontal and lateral views of the right tibia and fibula were 

obtained. Joint spaces are unremarkable. No acute fracture or bony 

destruction. Soft tissues are unremarkable.

 

 

Impression:

No acute process.

 

Electronically signed by: Zeus López MD (4/15/2019 8:06 AM) [id number]

## 2019-04-22 ENCOUNTER — HOSPITAL ENCOUNTER (EMERGENCY)
Dept: HOSPITAL 61 - ER | Age: 54
Discharge: HOME | End: 2019-04-22
Payer: SELF-PAY

## 2019-04-22 VITALS — WEIGHT: 160 LBS | BODY MASS INDEX: 22.4 KG/M2 | HEIGHT: 71 IN

## 2019-04-22 VITALS — DIASTOLIC BLOOD PRESSURE: 81 MMHG | SYSTOLIC BLOOD PRESSURE: 123 MMHG

## 2019-04-22 DIAGNOSIS — M25.551: ICD-10-CM

## 2019-04-22 DIAGNOSIS — F17.200: ICD-10-CM
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
## 2019-04-22 DIAGNOSIS — S93.601D: Primary | ICD-10-CM

## 2019-04-22 PROCEDURE — 99284 EMERGENCY DEPT VISIT MOD MDM: CPT

## 2019-04-22 PROCEDURE — 73630 X-RAY EXAM OF FOOT: CPT

## 2019-04-22 PROCEDURE — 73502 X-RAY EXAM HIP UNI 2-3 VIEWS: CPT

## 2019-04-22 NOTE — RAD
RIGHT FOOT AP LATERAL OBLIQUE

 

Clinical Indication: PT FELL 2 WEEKS AGO. PAIN TO RT FOOT

 

Comparison: None.

 

Findings: 

There is no acute fracture or dislocation.  The bony alignment is normal. 

Mineralization is normal. No bony erosion.

 

There is no soft tissue abnormality.  

 

IMPRESSION:

No acute fracture.  

 

Electronically signed by: Chente Hidalgo MD (4/22/2019 12:29 PM) [id number]

## 2019-04-22 NOTE — RAD
RIGHT HIP AP AND LATERAL

 

Clinical Indication: PT FELL 2 WEEKS AGO. PAIN TO RT HIP

 

Comparison: None.

 

Findings: 

AP pelvis view is obliqued.

There is no acute fracture or dislocation of the right hip. 

 

There is mild 2 moderate degenerative arthropathy of the bilateral hips 

for patient age. Visualized pelvic bones appear intact. There is no soft 

tissue abnormality or radiopaque foreign body.

 

IMPRESSION:

No acute fracture or dislocation.

 

Electronically signed by: Chente Hidalgo MD (4/22/2019 12:31 PM) [id number]

## 2019-04-22 NOTE — PHYS DOC
Past Medical History


Past Medical History:  No Pertinent History


Past Surgical History:  Other


Additional Past Surgical Histo:  Inguinal hernia R)


Additional Information:  


0.5 PPD


Alcohol Use:  Occasionally


Drug Use:  Marijuana





Adult General


Chief Complaint


Chief Complaint:  MECHANICAL FALL





HPI


HPI





Patient is a 54  year old male presents to the ED complaining of fall 10 days 

ago. States that he was about 5 feet high and was moving a dock plate when his 

right leg went down between the truck and the dock. Patient was seen after the 

fall in the ED and had imaging of his lower back and lower leg that were 

negative. Patient complains of pain to right hip and right foot. Describes his 

pain as sharp. He rates his pain as 7 out of 10. Denies LOC, vision changes, 

nausea/vomiting, head/neck injury, weakness, paresthesias, bowel/bladder 

changes, saddle anesthesia, fever, abdominal pain.





Review of Systems


Review of Systems





Constitutional: Denies fever or chills []


Eyes: Denies change in visual acuity, redness, or eye pain []


HENT: Denies nasal congestion or sore throat []


Respiratory: Denies cough or shortness of breath []


Cardiovascular: No additional information not addressed in HPI []


GI: Denies abdominal pain, nausea, vomiting, bloody stools or diarrhea []


: Denies dysuria or hematuria []


Musculoskeletal: Complains of hip and right foot pain. []


Integument: Denies rash or skin lesions []


Neurologic: Denies headache, focal weakness or sensory changes []





All other systems were reviewed and found to be within normal limits, except as 

documented in this note.





Allergies


Allergies





Allergies








Coded Allergies Type Severity Reaction Last Updated Verified


 


  No Known Drug Allergies    7/8/15 No











Physical Exam


Physical Exam





Constitutional: Well developed, well nourished, no acute distress, non-toxic 

appearance. []


HENT: Normocephalic, atraumatic


Eyes: PERRLA, EOMI, conjunctiva normal, no discharge. [] 


Neck: Normal range of motion, no tenderness, supple, no stridor. [] 


Cardiovascular:Heart rate regular rhythm, no murmur []


Lungs & Thorax:  Bilateral breath sounds clear to auscultation []


Abdomen: Bowel sounds normal, soft, no tenderness, no masses, no pulsatile 

masses. [] 


Skin: Warm, dry, no erythema, no rash. [] 


Back: No tenderness, no CVA tenderness. [] 


Extremities: Mild right lateral hip and right lateral foot tenderness. Full 

range of motion. NV Intact. No overlying skin changes. no cyanosis, no clubbing,

 ROM intact, no edema. [] 


Neurologic: Alert and oriented X 3, normal motor function, normal sensory 

function, no focal deficits noted. []


Psychologic: Affect normal, judgement normal, mood normal. []





Current Patient Data


Vital Signs





                                   Vital Signs








  Date Time  Temp Pulse Resp B/P (MAP) Pulse Ox O2 Delivery O2 Flow Rate FiO2


 


4/22/19 11:28 98.4 95 16 134/82 (99) 96 Room Air  





 98.4       











EKG


EKG


[]





Radiology/Procedures


Radiology/Procedures


PROCEDURE: FOOT RIGHT 3V





 


RIGHT FOOT AP LATERAL OBLIQUE


 


Clinical Indication: PT FELL 2 WEEKS AGO. PAIN TO RT FOOT


 


Comparison: None.


 


Findings: 


There is no acute fracture or dislocation.  The bony alignment is normal. 


Mineralization is normal. No bony erosion.


 


There is no soft tissue abnormality.  


 


IMPRESSION:


No acute fracture.  []





PROCEDURE: HIP RIGHT 2V WITH PELVIS





 


RIGHT HIP AP AND LATERAL


 


Clinical Indication: PT FELL 2 WEEKS AGO. PAIN TO RT HIP


 


Comparison: None.


 


Findings: 


AP pelvis view is obliqued.


There is no acute fracture or dislocation of the right hip. 


 


There is mild 2 moderate degenerative arthropathy of the bilateral hips 


for patient age. Visualized pelvic bones appear intact. There is no soft 


tissue abnormality or radiopaque foreign body.


 


IMPRESSION:


No acute fracture or dislocation.





Course & Med Decision Making


Course & Med Decision Making


Pertinent Labs and Imaging studies reviewed. (See chart for details)





[]Discussed imaging findings with patient. Patient's pain improved in the ED. 

Patient able to ambulate without assistance. Discussed symptomatic treatment 

follow-up with orthopedics if pain persists. Provided contact 

information/education. Discussed reasons to return to the ED. Patient 

understands and agrees with plan.





Dragon Disclaimer


Dragon Disclaimer


This electronic medical record was generated, in whole or in part, using a voice

 recognition dictation system.





Departure


Departure


Impression:  


   Primary Impression:  


   Hip pain


   Additional Impression:  


   Foot sprain


Disposition:  01 HOME, SELF-CARE


Condition:  IMPROVED


Referrals:  


NO PCP (PCP)








RIVER OLSON II, MD


Patient Instructions:  Foot Sprain, Hip Pain





Problem Qualifiers











ALBERTO ALANIZ        Apr 22, 2019 11:45

## 2021-02-08 ENCOUNTER — HOSPITAL ENCOUNTER (EMERGENCY)
Dept: HOSPITAL 61 - ER | Age: 56
Discharge: HOME | End: 2021-02-08
Payer: SELF-PAY

## 2021-02-08 VITALS — SYSTOLIC BLOOD PRESSURE: 135 MMHG | DIASTOLIC BLOOD PRESSURE: 67 MMHG

## 2021-02-08 VITALS — HEIGHT: 71 IN | BODY MASS INDEX: 22.22 KG/M2 | WEIGHT: 158.73 LBS

## 2021-02-08 DIAGNOSIS — Z98.890: ICD-10-CM

## 2021-02-08 DIAGNOSIS — F11.29: ICD-10-CM

## 2021-02-08 DIAGNOSIS — F17.200: ICD-10-CM

## 2021-02-08 DIAGNOSIS — F12.10: ICD-10-CM

## 2021-02-08 DIAGNOSIS — R10.84: Primary | ICD-10-CM

## 2021-02-08 DIAGNOSIS — R11.2: ICD-10-CM

## 2021-02-08 DIAGNOSIS — F10.10: ICD-10-CM

## 2021-02-08 LAB
% LYMPHS: 17 % (ref 24–48)
% MONOS: 4 % (ref 0–10)
% SEGS: 79 % (ref 35–66)
ACETAMIN: < 2 MCG/ML (ref 10–30)
ALBUMIN SERPL-MCNC: 4.1 G/DL (ref 3.4–5)
ALBUMIN/GLOB SERPL: 1.1 {RATIO} (ref 1–1.7)
ALP SERPL-CCNC: 90 U/L (ref 46–116)
ALT SERPL-CCNC: 17 U/L (ref 16–63)
AMPHETAMINE/METHAMPHETAMINE: (no result)
ANION GAP SERPL CALC-SCNC: 11 MMOL/L (ref 6–14)
APTT PPP: (no result) S
AST SERPL-CCNC: 20 U/L (ref 15–37)
BACTERIA #/AREA URNS HPF: 0 /HPF
BARBITURATES UR-MCNC: (no result) UG/ML
BASOPHILS # BLD AUTO: 0 X10^3/UL (ref 0–0.2)
BASOPHILS NFR BLD: 1 % (ref 0–3)
BENZODIAZ UR-MCNC: (no result) UG/L
BILIRUB SERPL-MCNC: 0.4 MG/DL (ref 0.2–1)
BILIRUB UR QL STRIP: (no result)
BUN SERPL-MCNC: 11 MG/DL (ref 8–26)
BUN/CREAT SERPL: 12 (ref 6–20)
CALCIUM SERPL-MCNC: 9.9 MG/DL (ref 8.5–10.1)
CANNABINOIDS UR-MCNC: (no result) UG/L
CHLORIDE SERPL-SCNC: 100 MMOL/L (ref 98–107)
CO2 SERPL-SCNC: 27 MMOL/L (ref 21–32)
COCAINE UR-MCNC: (no result) NG/ML
CREAT SERPL-MCNC: 0.9 MG/DL (ref 0.7–1.3)
EOSINOPHIL NFR BLD: 0 % (ref 0–3)
EOSINOPHIL NFR BLD: 0 X10^3/UL (ref 0–0.7)
ERYTHROCYTE [DISTWIDTH] IN BLOOD BY AUTOMATED COUNT: 14.1 % (ref 11.5–14.5)
ETHANOL SERPL-MCNC: < 10 MG/DL (ref 0–10)
FIBRINOGEN PPP-MCNC: CLEAR MG/DL
GFR SERPLBLD BASED ON 1.73 SQ M-ARVRAT: 105.6 ML/MIN
GLUCOSE SERPL-MCNC: 113 MG/DL (ref 70–99)
HCT VFR BLD CALC: 52.5 % (ref 39–53)
HGB BLD-MCNC: 17.7 G/DL (ref 13–17.5)
LYMPHOCYTES # BLD: 0.5 X10^3/UL (ref 1–4.8)
LYMPHOCYTES NFR BLD AUTO: 10 % (ref 24–48)
MCH RBC QN AUTO: 33 PG (ref 25–35)
MCHC RBC AUTO-ENTMCNC: 34 G/DL (ref 31–37)
MCV RBC AUTO: 98 FL (ref 79–100)
METHADONE SERPL-MCNC: (no result) NG/ML
MONO #: 0.3 X10^3/UL (ref 0–1.1)
MONOCYTES NFR BLD: 5 % (ref 0–9)
NEUT #: 4.7 X10^3/UL (ref 1.8–7.7)
NEUTROPHILS NFR BLD AUTO: 85 % (ref 31–73)
NITRITE UR QL STRIP: NEGATIVE
OPIATES UR-MCNC: (no result) NG/ML
PCP SERPL-MCNC: (no result) MG/DL
PH UR STRIP: 5.5 [PH]
PLATELET # BLD AUTO: 192 X10^3/UL (ref 140–400)
PLATELET # BLD EST: ADEQUATE 10*3/UL
POTASSIUM SERPL-SCNC: 4.1 MMOL/L (ref 3.5–5.1)
PROT SERPL-MCNC: 8 G/DL (ref 6.4–8.2)
PROT UR STRIP-MCNC: 100 MG/DL
RBC # BLD AUTO: 5.35 X10^6/UL (ref 4.3–5.7)
RBC #/AREA URNS HPF: (no result) /HPF (ref 0–2)
SALIC: 6 MG/DL (ref 2.8–20)
SODIUM SERPL-SCNC: 138 MMOL/L (ref 136–145)
UROBILINOGEN UR-MCNC: 0.2 MG/DL
WBC # BLD AUTO: 5.5 X10^3/UL (ref 4–11)
WBC #/AREA URNS HPF: (no result) /HPF (ref 0–4)

## 2021-02-08 PROCEDURE — 85025 COMPLETE CBC W/AUTO DIFF WBC: CPT

## 2021-02-08 PROCEDURE — 81001 URINALYSIS AUTO W/SCOPE: CPT

## 2021-02-08 PROCEDURE — 80053 COMPREHEN METABOLIC PANEL: CPT

## 2021-02-08 PROCEDURE — 96375 TX/PRO/DX INJ NEW DRUG ADDON: CPT

## 2021-02-08 PROCEDURE — 85007 BL SMEAR W/DIFF WBC COUNT: CPT

## 2021-02-08 PROCEDURE — G0480 DRUG TEST DEF 1-7 CLASSES: HCPCS

## 2021-02-08 PROCEDURE — 80329 ANALGESICS NON-OPIOID 1 OR 2: CPT

## 2021-02-08 PROCEDURE — 83690 ASSAY OF LIPASE: CPT

## 2021-02-08 PROCEDURE — 80307 DRUG TEST PRSMV CHEM ANLYZR: CPT

## 2021-02-08 PROCEDURE — 96374 THER/PROPH/DIAG INJ IV PUSH: CPT

## 2021-02-08 PROCEDURE — 74177 CT ABD & PELVIS W/CONTRAST: CPT

## 2021-02-08 PROCEDURE — 36415 COLL VENOUS BLD VENIPUNCTURE: CPT

## 2021-02-08 PROCEDURE — 99285 EMERGENCY DEPT VISIT HI MDM: CPT

## 2021-02-08 PROCEDURE — 96361 HYDRATE IV INFUSION ADD-ON: CPT

## 2021-02-08 NOTE — RAD
CT ABDOMEN+PELVIS W 



History: N/V abd pain  



Comparison: None.



Technique: After administration of intravenous contrast, helical CT of the abdomen and pelvis was per
formed from the lung bases through the ischial tuberosities. Coronal and sagittal reconstructions wer
e obtained. 75 mL of Omnipaque 300 were used. One or more of the following dose reduction techniques 
were utilized: Automated exposure control (AEC), Adjustment of mA and/or kV according to patient size
, Use of iterative reconstruction technique such as ASiR, CT scan done according to ALARA and image g
ently/image wisely



Abdomen Findings:

The visualized lung bases are clear.



The liver, gallbladder, pancreas, spleen, and bilateral adrenal glands are normal. 



Symmetric renal enhancement. There are a couple of small renal hypodensities, too small to characteri
ze but likely cysts. There is no hydronephrosis.   



The visualized loops of small bowel are normal. The visualized loops of large bowel are normal. There
 is no evidence of bowel obstruction. Appendix is normal. 



There is no free fluid.



There is no mesenteric or retroperitoneal adenopathy. 



The abdominal aorta is normal in caliber. Aortoiliac atherosclerotic disease.



Pelvis Findings:

Mild circumferential bladder wall thickening. Left testicle partially retracted into the inguinal can
al. No pelvic free fluid. There is no pelvic or inguinal adenopathy.  



There is no acute bony abnormality.  



IMPRESSION:

1. Normal caliber large and small bowel.



2. Mild circumferential bladder wall thickening, which could relate to underdistention, cystitis, or 
obstructive muscular hypertrophy.



Electronically signed by: Christian Williamson MD (2/8/2021 12:22 PM) [id number]

## 2021-02-08 NOTE — PHYS DOC
Past Medical History


Past Medical History:  No Pertinent History


Past Surgical History:  Other


Additional Past Surgical Histo:  Inguinal hernia R)


Smoking Status:  Current Every Day Smoker


Alcohol Use:  Heavy


Additional Information:  


WHISKEY 3-4 DAILY


Drug Use:  Marijuana





General Adult


EDM:


Chief Complaint:  NAUSEA/VOMITING/DIARRHA





HPI:


HPI:





Patient is a 56  year old male with no significant medical history presenting 

today complaining of nausea, vomiting, abdominal cramping, symptoms began 

yesterday after drinking several cups of [place] alcohol with ice cream that

the daughter made.  Patient states something in the mix did not agree with his 

stomach.  Denies any fever.





Review of Systems:


Review of Systems:


Constitutional:   Denies fever or chills. []


Eyes:   Denies change in visual acuity. []


HENT:   Denies nasal congestion or sore throat. [] 


Respiratory:   Denies cough or shortness of breath. [] 


Cardiovascular:   Denies chest pain or edema. [] 


GI:   Reports abdominal pain, nausea vomiting, denies diarrhea. [] 


:  Denies dysuria. [] 


Musculoskeletal:   Denies back pain or joint pain. [] 


Integument:   Denies rash. [] 


Neurologic:   Denies headache, focal weakness or sensory changes. [] 


Psychiatric:  Denies depression or anxiety. []





Heart Score:


Risk Factors:


Risk Factors:  DM, Current or recent (<one month) smoker, HTN, HLP, family 

history of CAD, obesity.


Risk Scores:


Score 0 - 3:  2.5% MACE over next 6 weeks - Discharge Home


Score 4 - 6:  20.3% MACE over next 6 weeks - Admit for Clinical Observation


Score 7 - 10:  72.7% MACE over next 6 weeks - Early Invasive Strategies





Current Medications:





Current Medications








 Medications


  (Trade)  Dose


 Ordered  Sig/Aravind  Start Time


 Stop Time Status Last Admin


Dose Admin


 


 Famotidine


  (Pepcid Vial)  20 mg  1X  ONCE  2/8/21 10:00


 2/8/21 10:01 DC 2/8/21 10:22


20 MG


 


 Ondansetron HCl


  (Zofran)  4 mg  1X  ONCE  2/8/21 10:00


 2/8/21 10:01 DC 2/8/21 10:22


4 MG


 


 Sodium Chloride  1,000 ml @ 


 1,000 mls/hr  1X  ONCE  2/8/21 10:00


 2/8/21 10:59  2/8/21 10:22


1,000 MLS/HR











Allergies:


Allergies:





Allergies








Coded Allergies Type Severity Reaction Last Updated Verified


 


  No Known Drug Allergies    7/8/15 No











Physical Exam:


PE:





Constitutional: Well developed, well nourished, no acute distress, non-toxic a

ppearance. []


HENT: Normocephalic, atraumatic, bilateral external ears normal, oropharynx 

moist, no oral exudates, nose normal. []


Eyes: PERRLA, EOMI, conjunctiva normal, no discharge. [] 


Neck: Normal range of motion, no tenderness, supple, no stridor. [] 


Cardiovascular:Heart rate regular rhythm, no murmur []


Lungs & Thorax:  Bilateral breath sounds clear to auscultation []


Abdomen: Bowel sounds normal, soft, no tenderness, no masses, no pulsatile 

masses. [] 


Skin: Warm, dry, no erythema, no rash. [] 


Back: No tenderness, no CVA tenderness. [] 


Extremities: No tenderness, no cyanosis, no clubbing, ROM intact, no edema. [] 


Neurologic: Alert and oriented X 3, normal motor function, normal sensory 

function, no focal deficits noted. []


Psychologic: Affect normal, judgement normal, mood normal. []





Current Patient Data:


Labs:





                                Laboratory Tests








Test


 2/8/21


10:17 2/8/21


10:34


 


White Blood Count


 5.5 x10^3/uL


(4.0-11.0) 





 


Red Blood Count


 5.35 x10^6/uL


(4.30-5.70) 





 


Hemoglobin


 17.7 g/dL


(13.0-17.5)  H 





 


Hematocrit


 52.5 %


(39.0-53.0) 





 


Mean Corpuscular Volume


 98 fL ()


 





 


Mean Corpuscular Hemoglobin 33 pg (25-35)   


 


Mean Corpuscular Hemoglobin


Concent 34 g/dL


(31-37) 





 


Red Cell Distribution Width


 14.1 %


(11.5-14.5) 





 


Platelet Count


 192 x10^3/uL


(140-400) 





 


Neutrophils (%) (Auto) 85 % (31-73)  H 


 


Lymphocytes (%) (Auto) 10 % (24-48)  L 


 


Monocytes (%) (Auto) 5 % (0-9)   


 


Eosinophils (%) (Auto) 0 % (0-3)   


 


Basophils (%) (Auto) 1 % (0-3)   


 


Neutrophils # (Auto)


 4.7 x10^3/uL


(1.8-7.7) 





 


Lymphocytes # (Auto)


 0.5 x10^3/uL


(1.0-4.8)  L 





 


Monocytes # (Auto)


 0.3 x10^3/uL


(0.0-1.1) 





 


Eosinophils # (Auto)


 0.0 x10^3/uL


(0.0-0.7) 





 


Basophils # (Auto)


 0.0 x10^3/uL


(0.0-0.2) 





 


Platelet Estimate Pending   


 


Lipase


 44 U/L


()  L 





 


Salicylates Level


 6.0 mg/dL


(2.8-20.0) 





 


Salicylate Last Dose Date Unknown   


 


Salicylate Last Dose Time Unknown   


 


Acetaminophen Level


 < 2 mcg/ml


(10-30)  L 





 


Acetaminophen Last Dose Date Unknown   


 


Acetaminophen Last Dose Time Unknown   


 


Ethyl Alcohol Level


 < 10 mg/dL


(0-10) 





 


Urine Opiates Screen  Pos (NEG)  


 


Urine Methadone Screen  Neg (NEG)  


 


Urine Barbiturates  Neg (NEG)  


 


Urine Phencyclidine Screen  Neg (NEG)  


 


Urine


Amphetamine/Methamphetamine 


 Neg (NEG)  





 


Urine Benzodiazepines Screen  Neg (NEG)  


 


Urine Cocaine Screen  Neg (NEG)  


 


Urine Cannabinoids Screen  Pos (NEG)  


 


Urine Ethyl Alcohol  Neg (NEG)  





                                Laboratory Tests


2/8/21 10:17








Vital Signs:





                                   Vital Signs








  Date Time  Temp Pulse Resp B/P (MAP) Pulse Ox O2 Delivery O2 Flow Rate FiO2


 


2/8/21 09:20 98.5 73 18 125/93 (104) 97 Room Air  





 98.5       











EKG:


EKG:


[]





Radiology/Procedures:


Radiology/Procedures:


[]PROCEDURE: CT ABD PELV W/ IV CONTRST ONLY





CT ABDOMEN+PELVIS W 





History: N/V abd pain  





Comparison: None.





Technique: After administration of intravenous contrast, helical CT of the 

abdomen and pelvis was performed from the lung bases through the ischial 

tuberosities. Coronal and sagittal reconstructions were obtained. 75 mL of 

Omnipaque 300 were used. One or more of the following dose reduction techniques 

were utilized: Automated exposure control (AEC), Adjustment of mA and/or kV 

according to patient size, Use of iterative reconstruction technique such as 

ASiR, CT scan done according to ALARA and image gently/image wisely





Abdomen Findings:


The visualized lung bases are clear.





The liver, gallbladder, pancreas, spleen, and bilateral adrenal glands are 

normal. 





Symmetric renal enhancement. There are a couple of small renal hypodensities, 

too small to characterize but likely cysts. There is no hydronephrosis.   





The visualized loops of small bowel are normal. The visualized loops of large 

bowel are normal. There is no evidence of bowel obstruction. Appendix is normal.

 





There is no free fluid.





There is no mesenteric or retroperitoneal adenopathy. 





The abdominal aorta is normal in caliber. Aortoiliac atherosclerotic disease.





Pelvis Findings:


Mild circumferential bladder wall thickening. Left testicle partially retracted 

into the inguinal canal. No pelvic free fluid. There is no pelvic or inguinal 

adenopathy.  





There is no acute bony abnormality.  





IMPRESSION:


1. Normal caliber large and small bowel.





2. Mild circumferential bladder wall thickening, which could relate to u

nderdistention, cystitis, or obstructive muscular hypertrophy.





Electronically signed by: Alanna Pride MD (2/8/2021 12:22 PM) [id number]














DICTATED and SIGNED BY:     ALANNA PRIDE MD


DATE:     02/08/21 [id number] 0





Course & Med Decision Making:


Course & Med Decision Making


Pertinent Labs and Imaging studies reviewed. (See chart for details)





This is a 56-year-old male patient presented to the ED today complaining of 

nausea, vomiting, abdominal cramping, symptoms began last night after drinking 

several cups of chronic oil liquor with ice cream that the daughter had made.  

He believes something in the mix did not agree with his stomach.





CBC with normal WBC, lipase 44, urine drug screen positive for opiates, 

marijuana, CMP with no acute findings.  Urine noted for dehydration.  CT of the 

abdomen and pelvic is negative.  Patient was given IV fluids Zofran and Pepcid. 

 On asking him where he gets opiates that are showing up in his urine, patient 

states he gets oxycodone from friends.  Informed him that is not a good idea.  

He states he takes them for chronic low back pain.  He was discharged to home 

and encouraged to consider getting help for drug and alcohol use





Dragon Disclaimer:


Dragon Disclaimer:


This electronic medical record was generated, in whole or in part, using a voice

 recognition dictation system.





Departure


Departure


Impression:  


   Primary Impression:  


   Marijuana abuse


   Additional Impressions:  


   Abdominal pain


   Qualified Codes:  R10.84 - Generalized abdominal pain


   Nausea and vomiting


   Qualified Codes:  R11.2 - Nausea with vomiting, unspecified


   ETOH abuse


   Opiate dependence


   Qualified Codes:  F11.29 - Opioid dependence with unspecified opioid-induced 

   disorder


Disposition:  01 DC HOME SELF CARE/HOMELESS


Condition:  STABLE


Referrals:  


NO PCP (PCP)


follow up with your doctor in 1 week


Patient Instructions:  Alcohol Problems, Marijuana Abuse and Chemical 

Dependency, Nausea and Vomiting





Additional Instructions:  


You were evaluated in the emergency room for nausea vomiting and abdominal pain.

  You tested positive for opiates and marijuana.  Please avoid taking opiates 

from friends.  Consider getting help for alcohol and drug use.  Push fluids.  

Follow-up with your doctor in 1 to 2 weeks


Scripts


Dicyclomine Hcl (DICYCLOMINE HCL) 20 Mg Tablet


1 TAB PO TID, #30 TAB 1 Refill


   Prov: ANGELA MARROQUIN         2/8/21 


Ondansetron (ONDANSETRON ODT) 4 Mg Tab.rapdis


1 TAB PO PRN Q6-8HRS, #16 TAB


   Prov: ANGELA MARROQUIN         2/8/21











ANGELA MARROQUIN               Feb 8, 2021 10:55

## 2021-06-11 ENCOUNTER — HOSPITAL ENCOUNTER (EMERGENCY)
Dept: HOSPITAL 61 - ER | Age: 56
Discharge: HOME | End: 2021-06-11
Payer: COMMERCIAL

## 2021-06-11 VITALS — BODY MASS INDEX: 21.73 KG/M2 | WEIGHT: 155.21 LBS | HEIGHT: 71 IN

## 2021-06-11 VITALS — SYSTOLIC BLOOD PRESSURE: 144 MMHG | DIASTOLIC BLOOD PRESSURE: 89 MMHG

## 2021-06-11 DIAGNOSIS — M54.5: ICD-10-CM

## 2021-06-11 DIAGNOSIS — M54.6: Primary | ICD-10-CM
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
## 2021-06-11 DIAGNOSIS — G89.11: ICD-10-CM

## 2021-06-11 DIAGNOSIS — F17.200: ICD-10-CM
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
## 2021-06-11 PROCEDURE — 72072 X-RAY EXAM THORAC SPINE 3VWS: CPT

## 2021-06-11 PROCEDURE — 72100 X-RAY EXAM L-S SPINE 2/3 VWS: CPT

## 2021-06-11 PROCEDURE — 99284 EMERGENCY DEPT VISIT MOD MDM: CPT

## 2021-06-11 NOTE — RAD
EXAM: Lumbar spine, 3 views; thoracic spine, 3 views.



HISTORY: Pain.



COMPARISON: None.



FINDINGS: 



Thoracic spine: 3 views of the thoracic spine are obtained. There is no listhesis. The vertebral bodi
es are normal in height. No fracture is seen. There is degenerative endplate remodeling with disc spa
ce narrowing and ossify ptosis at the lower cervical and upper thoracic levels.



Lumbar spine: 3 views of the lumbar spine are obtained. There is minimal retrolisthesis of L5 and S1.
 There is mild multilevel endplate remodeling. There is no fracture.



IMPRESSION: 

1. Multilevel degenerative change, primarily at the lower cervical and upper thoracic levels.

2. No acute osseous finding.



Electronically signed by: Chanda Mcintyre MD (6/11/2021 12:31 PM) [id number]

## 2021-06-11 NOTE — RAD
EXAM: Lumbar spine, 3 views; thoracic spine, 3 views.



HISTORY: Pain.



COMPARISON: None.



FINDINGS: 



Thoracic spine: 3 views of the thoracic spine are obtained. There is no listhesis. The vertebral bodi
es are normal in height. No fracture is seen. There is degenerative endplate remodeling with disc spa
ce narrowing and ossify ptosis at the lower cervical and upper thoracic levels.



Lumbar spine: 3 views of the lumbar spine are obtained. There is minimal retrolisthesis of L5 and S1.
 There is mild multilevel endplate remodeling. There is no fracture.



IMPRESSION: 

1. Multilevel degenerative change, primarily at the lower cervical and upper thoracic levels.

2. No acute osseous finding.



Electronically signed by: Chanda Mcintyre MD (6/11/2021 12:31 PM) [id number]
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
## 2022-09-27 ENCOUNTER — APPOINTMENT (RX ONLY)
Dept: URBAN - METROPOLITAN AREA CLINIC 76 | Facility: CLINIC | Age: 57
Setting detail: DERMATOLOGY
End: 2022-09-27

## 2022-09-27 DIAGNOSIS — L27.0 GENERALIZED SKIN ERUPTION DUE TO DRUGS AND MEDICAMENTS TAKEN INTERNALLY: ICD-10-CM

## 2022-09-27 PROCEDURE — ? OBSERVATION

## 2022-09-27 PROCEDURE — ? PRESCRIPTION

## 2022-09-27 PROCEDURE — ? TREATMENT REGIMEN

## 2022-09-27 PROCEDURE — ? COUNSELING

## 2022-09-27 PROCEDURE — ? ADDITIONAL NOTES

## 2022-09-27 PROCEDURE — 99203 OFFICE O/P NEW LOW 30 MIN: CPT

## 2022-09-27 RX ORDER — TRIAMCINOLONE ACETONIDE 1 MG/G
CREAM TOPICAL BID
Qty: 454 | Refills: 1 | Status: ERX | COMMUNITY
Start: 2022-09-27

## 2022-09-27 RX ORDER — HYDROXYZINE HYDROCHLORIDE 10 MG/1
TABLET, FILM COATED ORAL
Qty: 30 | Refills: 2 | Status: ERX | COMMUNITY
Start: 2022-09-27

## 2022-09-27 RX ADMIN — TRIAMCINOLONE ACETONIDE: 1 CREAM TOPICAL at 00:00

## 2022-09-27 RX ADMIN — HYDROXYZINE HYDROCHLORIDE: 10 TABLET, FILM COATED ORAL at 00:00

## 2022-09-27 ASSESSMENT — LOCATION ZONE DERM: LOCATION ZONE: ARM

## 2022-09-27 ASSESSMENT — LOCATION SIMPLE DESCRIPTION DERM
LOCATION SIMPLE: LEFT FOREARM
LOCATION SIMPLE: RIGHT FOREARM

## 2022-09-27 ASSESSMENT — LOCATION DETAILED DESCRIPTION DERM
LOCATION DETAILED: LEFT VENTRAL PROXIMAL FOREARM
LOCATION DETAILED: RIGHT VENTRAL PROXIMAL FOREARM

## 2022-09-27 NOTE — PROCEDURE: TREATMENT REGIMEN
Initiate Treatment: Triamcinolone apply to arms and legs with warm moist packs twice daily for one hour for 5 days.
Detail Level: Detailed

## 2022-09-27 NOTE — PROCEDURE: ADDITIONAL NOTES
Render Risk Assessment In Note?: no
Detail Level: Simple
Additional Notes: Drug unknown was given: ketamine, ketorolac, lidocaine, morphine, ondansetron

## 2022-09-27 NOTE — HPI: RASH
What Type Of Note Output Would You Prefer (Optional)?: Standard Output
Is The Patient Presenting As Previously Scheduled?: No, they are coming in before their scheduled appointment
How Severe Is Your Rash?: moderate
Is This A New Presentation, Or A Follow-Up?: Rash
Additional History: Was [facility]is[facility]. Rash started shortly after being treated in ER for back pain One month ago. Developed itching on right forearm when medication was administered. Reports rash spread from right arm to left arm and few areas on legs shortly thereafter. Was given Versed, Toradol, and Morphine.
no history of blood product transfusion

## 2022-10-11 ENCOUNTER — APPOINTMENT (RX ONLY)
Dept: URBAN - METROPOLITAN AREA CLINIC 76 | Facility: CLINIC | Age: 57
Setting detail: DERMATOLOGY
End: 2022-10-11

## 2022-10-11 DIAGNOSIS — L27.0 GENERALIZED SKIN ERUPTION DUE TO DRUGS AND MEDICAMENTS TAKEN INTERNALLY: ICD-10-CM | Status: RESOLVED

## 2022-10-11 PROCEDURE — ? ADDITIONAL NOTES

## 2022-10-11 PROCEDURE — ? TREATMENT REGIMEN

## 2022-10-11 PROCEDURE — 99213 OFFICE O/P EST LOW 20 MIN: CPT

## 2022-10-11 PROCEDURE — ? OBSERVATION

## 2022-10-11 ASSESSMENT — LOCATION SIMPLE DESCRIPTION DERM
LOCATION SIMPLE: LEFT FOREARM
LOCATION SIMPLE: RIGHT FOREARM

## 2022-10-11 ASSESSMENT — LOCATION DETAILED DESCRIPTION DERM
LOCATION DETAILED: RIGHT VENTRAL PROXIMAL FOREARM
LOCATION DETAILED: LEFT VENTRAL PROXIMAL FOREARM

## 2022-10-11 ASSESSMENT — LOCATION ZONE DERM: LOCATION ZONE: ARM

## 2022-11-28 ENCOUNTER — APPOINTMENT (RX ONLY)
Dept: URBAN - METROPOLITAN AREA CLINIC 76 | Facility: CLINIC | Age: 57
Setting detail: DERMATOLOGY
End: 2022-11-28

## 2022-11-28 ENCOUNTER — RX ONLY (OUTPATIENT)
Age: 57
Setting detail: RX ONLY
End: 2022-11-28

## 2022-11-28 DIAGNOSIS — L20.89 OTHER ATOPIC DERMATITIS: ICD-10-CM | Status: INADEQUATELY CONTROLLED

## 2022-11-28 PROBLEM — L20.84 INTRINSIC (ALLERGIC) ECZEMA: Status: ACTIVE | Noted: 2022-11-28

## 2022-11-28 PROCEDURE — ? PRESCRIPTION

## 2022-11-28 PROCEDURE — ? COUNSELING

## 2022-11-28 PROCEDURE — ? MEDICATION COUNSELING

## 2022-11-28 PROCEDURE — 99214 OFFICE O/P EST MOD 30 MIN: CPT

## 2022-11-28 PROCEDURE — ? TREATMENT REGIMEN

## 2022-11-28 RX ORDER — PREDNISONE 10 MG/1
TABLET ORAL
Qty: 40 | Refills: 0 | Status: ERX | COMMUNITY
Start: 2022-11-28

## 2022-11-28 RX ORDER — TRIAMCINOLONE ACETONIDE 1 MG/G
CREAM TOPICAL BID
Qty: 454 | Refills: 1 | Status: ERX | COMMUNITY
Start: 2022-11-28

## 2022-11-28 RX ADMIN — PREDNISONE: 10 TABLET ORAL at 00:00

## 2022-11-28 ASSESSMENT — BSA RASH: BSA RASH: 30

## 2022-11-28 ASSESSMENT — LOCATION SIMPLE DESCRIPTION DERM
LOCATION SIMPLE: RIGHT THIGH
LOCATION SIMPLE: RIGHT FOREARM
LOCATION SIMPLE: LEFT FOREARM
LOCATION SIMPLE: LEFT THIGH
LOCATION SIMPLE: LEFT PRETIBIAL REGION
LOCATION SIMPLE: ABDOMEN
LOCATION SIMPLE: RIGHT PRETIBIAL REGION
LOCATION SIMPLE: LEFT UPPER BACK

## 2022-11-28 ASSESSMENT — LOCATION DETAILED DESCRIPTION DERM
LOCATION DETAILED: RIGHT ANTERIOR PROXIMAL THIGH
LOCATION DETAILED: PERIUMBILICAL SKIN
LOCATION DETAILED: RIGHT PROXIMAL PRETIBIAL REGION
LOCATION DETAILED: RIGHT PROXIMAL DORSAL FOREARM
LOCATION DETAILED: RIGHT VENTRAL PROXIMAL FOREARM
LOCATION DETAILED: LEFT MEDIAL UPPER BACK
LOCATION DETAILED: LEFT VENTRAL PROXIMAL FOREARM
LOCATION DETAILED: LEFT PROXIMAL DORSAL FOREARM
LOCATION DETAILED: LEFT PROXIMAL PRETIBIAL REGION
LOCATION DETAILED: LEFT ANTERIOR PROXIMAL THIGH

## 2022-11-28 ASSESSMENT — LOCATION ZONE DERM
LOCATION ZONE: ARM
LOCATION ZONE: LEG
LOCATION ZONE: TRUNK

## 2022-11-28 NOTE — PROCEDURE: TREATMENT REGIMEN
Plan: Dupixent pa
Initiate Treatment: -prednisone 40 mg taper
Detail Level: Detailed
Continue Regimen: -TAC cream to affected areas

## 2022-11-28 NOTE — HPI: RASH
How Severe Is Your Rash?: moderate
Is This A New Presentation, Or A Follow-Up?: Rash
Additional History: History of eczema

## 2022-11-28 NOTE — PROCEDURE: MEDICATION COUNSELING
Cantharidin Counseling: Calcipotriene Counseling:  I discussed with the patient the risks of calcipotriene including but not limited to erythema, scaling, itching, and irritation.
Topical Retinoid counseling:  Patient advised to apply a pea-sized amount only at bedtime and wait 30 minutes after washing their face before applying.  If too drying, patient may add a non-comedogenic moisturizer. The patient verbalized understanding of the proper use and possible adverse effects of retinoids.  All of the patient's questions and concerns were addressed.
Otezla Pregnancy And Lactation Text: This medication is Pregnancy Category C and it isn't known if it is safe during pregnancy. It is unknown if it is excreted in breast milk.
Metronidazole Counseling:  I discussed with the patient the risks of metronidazole including but not limited to seizures, nausea/vomiting, a metallic taste in the mouth, nausea/vomiting and severe allergy.
Cimzia Counseling:  I discussed with the patient the risks of Cimzia including but not limited to immunosuppression, allergic reactions and infections.  The patient understands that monitoring is required including a PPD at baseline and must alert us or the primary physician if symptoms of infection or other concerning signs are noted.
Methotrexate Counseling:  Patient counseled regarding adverse effects of methotrexate including but not limited to nausea, vomiting, abnormalities in liver function tests. Patients may develop mouth sores, rash, diarrhea, and abnormalities in blood counts. The patient understands that monitoring is required including LFT's and blood counts.  There is a rare possibility of scarring of the liver and lung problems that can occur when taking methotrexate. Persistent nausea, loss of appetite, pale stools, dark urine, cough, and shortness of breath should be reported immediately. Patient advised to discontinue methotrexate treatment at least three months before attempting to become pregnant.  I discussed the need for folate supplements while taking methotrexate.  These supplements can decrease side effects during methotrexate treatment. The patient verbalized understanding of the proper use and possible adverse effects of methotrexate.  All of the patient's questions and concerns were addressed.
Rinvoq Counseling: I discussed with the patient the risks of Rinvoq therapy including but not limited to upper respiratory tract infections, shingles, cold sores, bronchitis, nausea, cough, fever, acne, and headache. Live vaccines should be avoided.  This medication has been linked to serious infections; higher rate of mortality; malignancy and lymphoproliferative disorders; major adverse cardiovascular events; thrombosis; thrombocytopenia, anemia, and neutropenia; lipid elevations; liver enzyme elevations; and gastrointestinal perforations.
Topical Sulfur Applications Pregnancy And Lactation Text: This medication is considered safe during pregnancy and breast feeding secondary to limited systemic absorption.
Sarecycline Pregnancy And Lactation Text: This medication is Pregnancy Category D and not consider safe during pregnancy. It is also excreted in breast milk.
Dutasteride Male Counseling: Dustasteride Counseling:  I discussed with the patient the risks of use of dutasteride including but not limited to decreased libido, decreased ejaculate volume, and gynecomastia. Women who can become pregnant should not handle medication.  All of the patient's questions and concerns were addressed.
Clindamycin Counseling: I counseled the patient regarding use of clindamycin as an antibiotic for prophylactic and/or therapeutic purposes. Clindamycin is active against numerous classes of bacteria, including skin bacteria. Side effects may include nausea, diarrhea, gastrointestinal upset, rash, hives, yeast infections, and in rare cases, colitis.
Azelaic Acid Counseling: Patient counseled that medicine may cause skin irritation and to avoid applying near the eyes.  In the event of skin irritation, the patient was advised to reduce the amount of the drug applied or use it less frequently.   The patient verbalized understanding of the proper use and possible adverse effects of azelaic acid.  All of the patient's questions and concerns were addressed.
Bexarotene Counseling:  I discussed with the patient the risks of bexarotene including but not limited to hair loss, dry lips/skin/eyes, liver abnormalities, hyperlipidemia, pancreatitis, depression/suicidal ideation, photosensitivity, drug rash/allergic reactions, hypothyroidism, anemia, leukopenia, infection, cataracts, and teratogenicity.  Patient understands that they will need regular blood tests to check lipid profile, liver function tests, white blood cell count, thyroid function tests and pregnancy test if applicable.
Humira Pregnancy And Lactation Text: This medication is Pregnancy Category B and is considered safe during pregnancy. It is unknown if this medication is excreted in breast milk.
Klisyri Pregnancy And Lactation Text: It is unknown if this medication can harm a developing fetus or if it is excreted in breast milk.
Tranexamic Acid Counseling:  Patient advised of the small risk of bleeding problems with tranexamic acid. They were also instructed to call if they developed any nausea, vomiting or diarrhea. All of the patient's questions and concerns were addressed.
Azathioprine Counseling:  I discussed with the patient the risks of azathioprine including but not limited to myelosuppression, immunosuppression, hepatotoxicity, lymphoma, and infections.  The patient understands that monitoring is required including baseline LFTs, Creatinine, possible TPMP genotyping and weekly CBCs for the first month and then every 2 weeks thereafter.  The patient verbalized understanding of the proper use and possible adverse effects of azathioprine.  All of the patient's questions and concerns were addressed.
Protopic Counseling: Patient may experience a mild burning sensation during topical application. Protopic is not approved in children less than 2 years of age. There have been case reports of hematologic and skin malignancies in patients using topical calcineurin inhibitors although causality is questionable.
Simponi Counseling:  I discussed with the patient the risks of golimumab including but not limited to myelosuppression, immunosuppression, autoimmune hepatitis, demyelinating diseases, lymphoma, and serious infections.  The patient understands that monitoring is required including a PPD at baseline and must alert us or the primary physician if symptoms of infection or other concerning signs are noted.
Xolair Pregnancy And Lactation Text: This medication is Pregnancy Category B and is considered safe during pregnancy. This medication is excreted in breast milk.
Dapsone Counseling: I discussed with the patient the risks of dapsone including but not limited to hemolytic anemia, agranulocytosis, rashes, methemoglobinemia, kidney failure, peripheral neuropathy, headaches, GI upset, and liver toxicity.  Patients who start dapsone require monitoring including baseline LFTs and weekly CBCs for the first month, then every month thereafter.  The patient verbalized understanding of the proper use and possible adverse effects of dapsone.  All of the patient's questions and concerns were addressed.
Elidel Pregnancy And Lactation Text: This medication is Pregnancy Category C. It is unknown if this medication is excreted in breast milk.
Terbinafine Counseling: Patient counseling regarding adverse effects of terbinafine including but not limited to headache, diarrhea, rash, upset stomach, liver function test abnormalities, itching, taste/smell disturbance, nausea, abdominal pain, and flatulence.  There is a rare possibility of liver failure that can occur when taking terbinafine.  The patient understands that a baseline LFT and kidney function test may be required. The patient verbalized understanding of the proper use and possible adverse effects of terbinafine.  All of the patient's questions and concerns were addressed.
Niacinamide Pregnancy And Lactation Text: These medications are considered safe during pregnancy.
Opioid Counseling: I discussed with the patient the potential side effects of opioids including but not limited to addiction, altered mental status, and depression. I stressed avoiding alcohol, benzodiazepines, muscle relaxants and sleep aids unless specifically okayed by a physician. The patient verbalized understanding of the proper use and possible adverse effects of opioids. All of the patient's questions and concerns were addressed. They were instructed to flush the remaining pills down the toilet if they did not need them for pain.
Metronidazole Pregnancy And Lactation Text: This medication is Pregnancy Category B and considered safe during pregnancy.  It is also excreted in breast milk.
Methotrexate Pregnancy And Lactation Text: This medication is Pregnancy Category X and is known to cause fetal harm. This medication is excreted in breast milk.
Tetracycline Counseling: Patient counseled regarding possible photosensitivity and increased risk for sunburn.  Patient instructed to avoid sunlight, if possible.  When exposed to sunlight, patients should wear protective clothing, sunglasses, and sunscreen.  The patient was instructed to call the office immediately if the following severe adverse effects occur:  hearing changes, easy bruising/bleeding, severe headache, or vision changes.  The patient verbalized understanding of the proper use and possible adverse effects of tetracycline.  All of the patient's questions and concerns were addressed. Patient understands to avoid pregnancy while on therapy due to potential birth defects.
Albendazole Counseling:  I discussed with the patient the risks of albendazole including but not limited to cytopenia, kidney damage, nausea/vomiting and severe allergy.  The patient understands that this medication is being used in an off-label manner.
Cantharidin Pregnancy And Lactation Text: The use of this medication during pregnancy or lactation is not recommended as there is insufficient data.
Oxybutynin Counseling:  I discussed with the patient the risks of oxybutynin including but not limited to skin rash, drowsiness, dry mouth, difficulty urinating, and blurred vision.
Cimzia Pregnancy And Lactation Text: This medication crosses the placenta but can be considered safe in certain situations. Cimzia may be excreted in breast milk.
Bexarotene Pregnancy And Lactation Text: This medication is Pregnancy Category X and should not be given to women who are pregnant or may become pregnant. This medication should not be used if you are breast feeding.
Rinvoq Pregnancy And Lactation Text: Based on animal studies, Rinvoq may cause embryo-fetal harm when administered to pregnant women.  The medication should not be used in pregnancy.  Breastfeeding is not recommended during treatment and for 6 days after the last dose.
Wartpeel Counseling:  I discussed with the patient the risks of Wartpeel including but not limited to erythema, scaling, itching, weeping, crusting, and pain.
Minoxidil Counseling: Minoxidil is a topical medication which can increase blood flow where it is applied. It is uncertain how this medication increases hair growth. Side effects are uncommon and include stinging and allergic reactions.
Ilumya Counseling: I discussed with the patient the risks of tildrakizumab including but not limited to immunosuppression, malignancy, posterior leukoencephalopathy syndrome, and serious infections.  The patient understands that monitoring is required including a PPD at baseline and must alert us or the primary physician if symptoms of infection or other concerning signs are noted.
Azathioprine Pregnancy And Lactation Text: This medication is Pregnancy Category D and isn't considered safe during pregnancy. It is unknown if this medication is excreted in breast milk.
Dutasteride Pregnancy And Lactation Text: This medication is absolutely contraindicated in women, especially during pregnancy and breast feeding. Feminization of male fetuses is possible if taking while pregnant.
Clindamycin Pregnancy And Lactation Text: This medication can be used in pregnancy if certain situations. Clindamycin is also present in breast milk.
Azelaic Acid Pregnancy And Lactation Text: This medication is considered safe during pregnancy and breast feeding.
Terbinafine Pregnancy And Lactation Text: This medication is Pregnancy Category B and is considered safe during pregnancy. It is also excreted in breast milk and breast feeding isn't recommended.
Simponi Pregnancy And Lactation Text: The risk during pregnancy and breastfeeding is uncertain with this medication.
Tranexamic Acid Pregnancy And Lactation Text: It is unknown if this medication is safe during pregnancy or breast feeding.
Eucrisa Counseling: Patient may experience a mild burning sensation during topical application. Eucrisa is not approved in children less than 2 years of age.
Protopic Pregnancy And Lactation Text: This medication is Pregnancy Category C. It is unknown if this medication is excreted in breast milk when applied topically.
Dapsone Pregnancy And Lactation Text: This medication is Pregnancy Category C and is not considered safe during pregnancy or breast feeding.
Nsaids Counseling: NSAID Counseling: I discussed with the patient that NSAIDs should be taken with food. Prolonged use of NSAIDs can result in the development of stomach ulcers.  Patient advised to stop taking NSAIDs if abdominal pain occurs.  The patient verbalized understanding of the proper use and possible adverse effects of NSAIDs.  All of the patient's questions and concerns were addressed.
Opioid Pregnancy And Lactation Text: These medications can lead to premature delivery and should be avoided during pregnancy. These medications are also present in breast milk in small amounts.
Albendazole Pregnancy And Lactation Text: This medication is Pregnancy Category C and it isn't known if it is safe during pregnancy. It is also excreted in breast milk.
Tazorac Counseling:  Patient advised that medication is irritating and drying.  Patient may need to apply sparingly and wash off after an hour before eventually leaving it on overnight.  The patient verbalized understanding of the proper use and possible adverse effects of tazorac.  All of the patient's questions and concerns were addressed.
Oxybutynin Pregnancy And Lactation Text: This medication is Pregnancy Category B and is considered safe during pregnancy. It is unknown if it is excreted in breast milk.
Minocycline Counseling: Patient advised regarding possible photosensitivity and discoloration of the teeth, skin, lips, tongue and gums.  Patient instructed to avoid sunlight, if possible.  When exposed to sunlight, patients should wear protective clothing, sunglasses, and sunscreen.  The patient was instructed to call the office immediately if the following severe adverse effects occur:  hearing changes, easy bruising/bleeding, severe headache, or vision changes.  The patient verbalized understanding of the proper use and possible adverse effects of minocycline.  All of the patient's questions and concerns were addressed.
Prednisone Counseling:  I discussed with the patient the risks of prolonged use of prednisone including but not limited to weight gain, insomnia, osteoporosis, mood changes, diabetes, susceptibility to infection, glaucoma and high blood pressure.  In cases where prednisone use is prolonged, patients should be monitored with blood pressure checks, serum glucose levels and an eye exam.  Additionally, the patient may need to be placed on GI prophylaxis, PCP prophylaxis, and calcium and vitamin D supplementation and/or a bisphosphonate.  The patient verbalized understanding of the proper use and the possible adverse effects of prednisone.  All of the patient's questions and concerns were addressed.
Sotyktu Counseling:  I discussed the most common side effects of Sotyktu including: common cold, sore throat, sinus infections, cold sores, canker sores, folliculitis, and acne.? I also discussed more serious side effects of Sotyktu including but not limited to: serious allergic reactions; increased risk for infections such as TB; cancers such as lymphomas; rhabdomyolysis and elevated CPK; and elevated triglycerides and liver enzymes.?
Cosentyx Counseling:  I discussed with the patient the risks of Cosentyx including but not limited to worsening of Crohn's disease, immunosuppression, allergic reactions and infections.  The patient understands that monitoring is required including a PPD at baseline and must alert us or the primary physician if symptoms of infection or other concerning signs are noted.
Wartpeel Pregnancy And Lactation Text: This medication is Pregnancy Category X and contraindicated in pregnancy and in women who may become pregnant. It is unknown if this medication is excreted in breast milk.
Erivedge Counseling- I discussed with the patient the risks of Erivedge including but not limited to nausea, vomiting, diarrhea, constipation, weight loss, changes in the sense of taste, decreased appetite, muscle spasms, and hair loss.  The patient verbalized understanding of the proper use and possible adverse effects of Erivedge.  All of the patient's questions and concerns were addressed.
Finasteride Male Counseling: Finasteride Counseling:  I discussed with the patient the risks of use of finasteride including but not limited to decreased libido, decreased ejaculate volume, gynecomastia, and depression. Women should not handle medication.  All of the patient's questions and concerns were addressed.
Minoxidil Pregnancy And Lactation Text: This medication has not been assigned a Pregnancy Risk Category but animal studies failed to show danger with the topical medication. It is unknown if the medication is excreted in breast milk.
Doxycycline Counseling:  Patient counseled regarding possible photosensitivity and increased risk for sunburn.  Patient instructed to avoid sunlight, if possible.  When exposed to sunlight, patients should wear protective clothing, sunglasses, and sunscreen.  The patient was instructed to call the office immediately if the following severe adverse effects occur:  hearing changes, easy bruising/bleeding, severe headache, or vision changes.  The patient verbalized understanding of the proper use and possible adverse effects of doxycycline.  All of the patient's questions and concerns were addressed.
Benzoyl Peroxide Counseling: Patient counseled that medicine may cause skin irritation and bleach clothing.  In the event of skin irritation, the patient was advised to reduce the amount of the drug applied or use it less frequently.   The patient verbalized understanding of the proper use and possible adverse effects of benzoyl peroxide.  All of the patient's questions and concerns were addressed.
Isotretinoin Counseling: Patient should get monthly blood tests, not donate blood, not drive at night if vision affected, not share medication, and not undergo elective surgery for 6 months after tx completed. Side effects reviewed, pt to contact office should one occur.
Cellcept Counseling:  I discussed with the patient the risks of mycophenolate mofetil including but not limited to infection/immunosuppression, GI upset, hypokalemia, hypercholesterolemia, bone marrow suppression, lymphoproliferative disorders, malignancy, GI ulceration/bleed/perforation, colitis, interstitial lung disease, kidney failure, progressive multifocal leukoencephalopathy, and birth defects.  The patient understands that monitoring is required including a baseline creatinine and regular CBC testing. In addition, patient must alert us immediately if symptoms of infection or other concerning signs are noted.
Valtrex Counseling: I discussed with the patient the risks of valacyclovir including but not limited to kidney damage, nausea, vomiting and severe allergy.  The patient understands that if the infection seems to be worsening or is not improving, they are to call.
Nsaids Pregnancy And Lactation Text: These medications are considered safe up to 30 weeks gestation. It is excreted in breast milk.
Skyrizi Counseling: I discussed with the patient the risks of risankizumab-rzaa including but not limited to immunosuppression, and serious infections.  The patient understands that monitoring is required including a PPD at baseline and must alert us or the primary physician if symptoms of infection or other concerning signs are noted.
Qbrexza Counseling:  I discussed with the patient the risks of Qbrexza including but not limited to headache, mydriasis, blurred vision, dry eyes, nasal dryness, dry mouth, dry throat, dry skin, urinary hesitation, and constipation.  Local skin reactions including erythema, burning, stinging, and itching can also occur.
Gabapentin Counseling: I discussed with the patient the risks of gabapentin including but not limited to dizziness, somnolence, fatigue and ataxia.
Ivermectin Counseling:  Patient instructed to take medication on an empty stomach with a full glass of water.  Patient informed of potential adverse effects including but not limited to nausea, diarrhea, dizziness, itching, and swelling of the extremities or lymph nodes.  The patient verbalized understanding of the proper use and possible adverse effects of ivermectin.  All of the patient's questions and concerns were addressed.
Tazorac Pregnancy And Lactation Text: This medication is not safe during pregnancy. It is unknown if this medication is excreted in breast milk.
Propranolol Counseling:  I discussed with the patient the risks of propranolol including but not limited to low heart rate, low blood pressure, low blood sugar, restlessness and increased cold sensitivity. They should call the office if they experience any of these side effects.
Prednisone Pregnancy And Lactation Text: This medication is Pregnancy Category C and it isn't know if it is safe during pregnancy. This medication is excreted in breast milk.
Sotyktu Pregnancy And Lactation Text: There is insufficient data to evaluate whether or not Sotyktu is safe to use during pregnancy.? ?It is not known if Sotyktu passes into breast milk and whether or not it is safe to use when breastfeeding.??
Cimetidine Counseling:  I discussed with the patient the risks of Cimetidine including but not limited to gynecomastia, headache, diarrhea, nausea, drowsiness, arrhythmias, pancreatitis, skin rashes, psychosis, bone marrow suppression and kidney toxicity.
Isotretinoin Pregnancy And Lactation Text: This medication is Pregnancy Category X and is considered extremely dangerous during pregnancy. It is unknown if it is excreted in breast milk.
Azithromycin Counseling:  I discussed with the patient the risks of azithromycin including but not limited to GI upset, allergic reaction, drug rash, diarrhea, and yeast infections.
Winlevi Counseling:  I discussed with the patient the risks of topical clascoterone including but not limited to erythema, scaling, itching, and stinging. Patient voiced their understanding.
Mirvaso Counseling: Mirvaso is a topical medication which can decrease superficial blood flow where applied. Side effects are uncommon and include stinging, redness and allergic reactions.
Finasteride Pregnancy And Lactation Text: This medication is absolutely contraindicated during pregnancy. It is unknown if it is excreted in breast milk.
Infliximab Counseling:  I discussed with the patient the risks of infliximab including but not limited to myelosuppression, immunosuppression, autoimmune hepatitis, demyelinating diseases, lymphoma, and serious infections.  The patient understands that monitoring is required including a PPD at baseline and must alert us or the primary physician if symptoms of infection or other concerning signs are noted.
Erivedge Pregnancy And Lactation Text: This medication is Pregnancy Category X and is absolutely contraindicated during pregnancy. It is unknown if it is excreted in breast milk.
Doxycycline Pregnancy And Lactation Text: This medication is Pregnancy Category D and not consider safe during pregnancy. It is also excreted in breast milk but is considered safe for shorter treatment courses.
Benzoyl Peroxide Pregnancy And Lactation Text: This medication is Pregnancy Category C. It is unknown if benzoyl peroxide is excreted in breast milk.
Arava Counseling:  Patient counseled regarding adverse effects of Arava including but not limited to nausea, vomiting, abnormalities in liver function tests. Patients may develop mouth sores, rash, diarrhea, and abnormalities in blood counts. The patient understands that monitoring is required including LFTs and blood counts.  There is a rare possibility of scarring of the liver and lung problems that can occur when taking methotrexate. Persistent nausea, loss of appetite, pale stools, dark urine, cough, and shortness of breath should be reported immediately. Patient advised to discontinue Arava treatment and consult with a physician prior to attempting conception. The patient will have to undergo a treatment to eliminate Arava from the body prior to conception.
Gabapentin Pregnancy And Lactation Text: This medication is Pregnancy Category C and isn't considered safe during pregnancy. It is excreted in breast milk.
Valtrex Pregnancy And Lactation Text: this medication is Pregnancy Category B and is considered safe during pregnancy. This medication is not directly found in breast milk but it's metabolite acyclovir is present.
Fluconazole Counseling:  Patient counseled regarding adverse effects of fluconazole including but not limited to headache, diarrhea, nausea, upset stomach, liver function test abnormalities, taste disturbance, and stomach pain.  There is a rare possibility of liver failure that can occur when taking fluconazole.  The patient understands that monitoring of LFTs and kidney function test may be required, especially at baseline. The patient verbalized understanding of the proper use and possible adverse effects of fluconazole.  All of the patient's questions and concerns were addressed.
Qbrexza Pregnancy And Lactation Text: There is no available data on Qbrexza use in pregnant women.  There is no available data on Qbrexza use in lactation.
Olanzapine Counseling- I discussed with the patient the common side effects of olanzapine including but are not limited to: lack of energy, dry mouth, increased appetite, sleepiness, tremor, constipation, dizziness, changes in behavior, or restlessness.  Explained that teenagers are more likely to experience headaches, abdominal pain, pain in the arms or legs, tiredness, and sleepiness.  Serious side effects include but are not limited: increased risk of death in elderly patients who are confused, have memory loss, or dementia-related psychosis; hyperglycemia; increased cholesterol and triglycerides; and weight gain.
Hydroquinone Counseling:  Patient advised that medication may result in skin irritation, lightening (hypopigmentation), dryness, and burning.  In the event of skin irritation, the patient was advised to reduce the amount of the drug applied or use it less frequently.  Rarely, spots that are treated with hydroquinone can become darker (pseudoochronosis).  Should this occur, patient instructed to stop medication and call the office. The patient verbalized understanding of the proper use and possible adverse effects of hydroquinone.  All of the patient's questions and concerns were addressed.
Topical Clindamycin Counseling: Patient counseled that this medication may cause skin irritation or allergic reactions.  In the event of skin irritation, the patient was advised to reduce the amount of the drug applied or use it less frequently.   The patient verbalized understanding of the proper use and possible adverse effects of clindamycin.  All of the patient's questions and concerns were addressed.
Quinolones Counseling:  I discussed with the patient the risks of fluoroquinolones including but not limited to GI upset, allergic reaction, drug rash, diarrhea, dizziness, photosensitivity, yeast infections, liver function test abnormalities, tendonitis/tendon rupture.
Propranolol Pregnancy And Lactation Text: This medication is Pregnancy Category C and it isn't known if it is safe during pregnancy. It is excreted in breast milk.
5-Fu Counseling: 5-Fluorouracil Counseling:  I discussed with the patient the risks of 5-fluorouracil including but not limited to erythema, scaling, itching, weeping, crusting, and pain.
Azithromycin Pregnancy And Lactation Text: This medication is considered safe during pregnancy and is also secreted in breast milk.
Dupixent Counseling: I discussed with the patient the risks of dupilumab including but not limited to eye infection and irritation, cold sores, injection site reactions, worsening of asthma, allergic reactions and increased risk of parasitic infection.  Live vaccines should be avoided while taking dupilumab. Dupilumab will also interact with certain medications such as warfarin and cyclosporine. The patient understands that monitoring is required and they must alert us or the primary physician if symptoms of infection or other concerning signs are noted.
Libtayo Counseling- I discussed with the patient the risks of Libtayo including but not limited to nausea, vomiting, diarrhea, and bone or muscle pain.  The patient verbalized understanding of the proper use and possible adverse effects of Libtayo.  All of the patient's questions and concerns were addressed.
High Dose Vitamin A Counseling: Side effects reviewed, pt to contact office should one occur.
Xeljanz Counseling: I discussed with the patient the risks of Xeljanz therapy including increased risk of infection, liver issues, headache, diarrhea, or cold symptoms. Live vaccines should be avoided. They were instructed to call if they have any problems.
Winlevi Pregnancy And Lactation Text: This medication is considered safe during pregnancy and breastfeeding.
Detail Level: Detailed
Mirvaso Pregnancy And Lactation Text: This medication has not been assigned a Pregnancy Risk Category. It is unknown if the medication is excreted in breast milk.
Cyclophosphamide Counseling:  I discussed with the patient the risks of cyclophosphamide including but not limited to hair loss, hormonal abnormalities, decreased fertility, abdominal pain, diarrhea, nausea and vomiting, bone marrow suppression and infection. The patient understands that monitoring is required while taking this medication.
Birth Control Pills Counseling: Birth Control Pill Counseling: I discussed with the patient the potential side effects of OCPs including but not limited to increased risk of stroke, heart attack, thrombophlebitis, deep venous thrombosis, hepatic adenomas, breast changes, GI upset, headaches, and depression.  The patient verbalized understanding of the proper use and possible adverse effects of OCPs. All of the patient's questions and concerns were addressed.
Griseofulvin Pregnancy And Lactation Text: This medication is Pregnancy Category X and is known to cause serious birth defects. It is unknown if this medication is excreted in breast milk but breast feeding should be avoided.
Carac Counseling:  I discussed with the patient the risks of Carac including but not limited to erythema, scaling, itching, weeping, crusting, and pain.
Olanzapine Pregnancy And Lactation Text: This medication is pregnancy category C.   There are no adequate and well controlled trials with olanzapine in pregnant females.  Olanzapine should be used during pregnancy only if the potential benefit justifies the potential risk to the fetus.   In a study in lactating healthy women, olanzapine was excreted in breast milk.  It is recommended that women taking olanzapine should not breast feed.
Cibinqo Counseling: I discussed with the patient the risks of Cibinqo therapy including but not limited to common cold, nausea, headache, cold sores, increased blood CPK levels, dizziness, UTIs, fatigue, acne, and vomitting. Live vaccines should be avoided.  This medication has been linked to serious infections; higher rate of mortality; malignancy and lymphoproliferative disorders; major adverse cardiovascular events; thrombosis; thrombocytopenia and lymphopenia; lipid elevations; and retinal detachment.
Stelara Counseling:  I discussed with the patient the risks of ustekinumab including but not limited to immunosuppression, malignancy, posterior leukoencephalopathy syndrome, and serious infections.  The patient understands that monitoring is required including a PPD at baseline and must alert us or the primary physician if symptoms of infection or other concerning signs are noted.
Use Enhanced Medication Counseling?: No
Fluconazole Pregnancy And Lactation Text: This medication is Pregnancy Category C and it isn't know if it is safe during pregnancy. It is also excreted in breast milk.
Rhofade Counseling: Rhofade is a topical medication which can decrease superficial blood flow where applied. Side effects are uncommon and include stinging, redness and allergic reactions.
Glycopyrrolate Counseling:  I discussed with the patient the risks of glycopyrrolate including but not limited to skin rash, drowsiness, dry mouth, difficulty urinating, and blurred vision.
SSKI Counseling:  I discussed with the patient the risks of SSKI including but not limited to thyroid abnormalities, metallic taste, GI upset, fever, headache, acne, arthralgias, paraesthesias, lymphadenopathy, easy bleeding, arrhythmias, and allergic reaction.
Rituxan Counseling:  I discussed with the patient the risks of Rituxan infusions. Side effects can include infusion reactions, severe drug rashes including mucocutaneous reactions, reactivation of latent hepatitis and other infections and rarely progressive multifocal leukoencephalopathy.  All of the patient's questions and concerns were addressed.
Xelrenatoz Pregnancy And Lactation Text: This medication is Pregnancy Category D and is not considered safe during pregnancy.  The risk during breast feeding is also uncertain.
Bactrim Counseling:  I discussed with the patient the risks of sulfa antibiotics including but not limited to GI upset, allergic reaction, drug rash, diarrhea, dizziness, photosensitivity, and yeast infections.  Rarely, more serious reactions can occur including but not limited to aplastic anemia, agranulocytosis, methemoglobinemia, blood dyscrasias, liver or kidney failure, lung infiltrates or desquamative/blistering drug rashes.
Dupixent Pregnancy And Lactation Text: This medication likely crosses the placenta but the risk for the fetus is uncertain. This medication is excreted in breast milk.
VTAMA Counseling: I discussed with the patient that VTAMA is not for use in the eyes, mouth or mouth. They should call the office if they develop any signs of allergic reactions to VTAMA. The patient verbalized understanding of the proper use and possible adverse effects of VTAMA.  All of the patient's questions and concerns were addressed.
Doxepin Counseling:  Patient advised that the medication is sedating and not to drive a car after taking this medication. Patient informed of potential adverse effects including but not limited to dry mouth, urinary retention, and blurry vision.  The patient verbalized understanding of the proper use and possible adverse effects of doxepin.  All of the patient's questions and concerns were addressed.
Itraconazole Counseling:  I discussed with the patient the risks of itraconazole including but not limited to liver damage, nausea/vomiting, neuropathy, and severe allergy.  The patient understands that this medication is best absorbed when taken with acidic beverages such as non-diet cola or ginger ale.  The patient understands that monitoring is required including baseline LFTs and repeat LFTs at intervals.  The patient understands that they are to contact us or the primary physician if concerning signs are noted.
Clofazimine Counseling:  I discussed with the patient the risks of clofazimine including but not limited to skin and eye pigmentation, liver damage, nausea/vomiting, gastrointestinal bleeding and allergy.
Libtayo Pregnancy And Lactation Text: This medication is contraindicated in pregnancy and when breast feeding.
Birth Control Pills Pregnancy And Lactation Text: This medication should be avoided if pregnant and for the first 30 days post-partum.
Opzelura Counseling:  I discussed with the patient the risks of Opzelura including but not limited to nasopharngitis, bronchitis, ear infection, eosinophila, hives, diarrhea, folliculitis, tonsillitis, and rhinorrhea.  Taken orally, this medication has been linked to serious infections; higher rate of mortality; malignancy and lymphoproliferative disorders; major adverse cardiovascular events; thrombosis; thrombocytopenia, anemia, and neutropenia; and lipid elevations.
Griseofulvin Counseling:  I discussed with the patient the risks of griseofulvin including but not limited to photosensitivity, cytopenia, liver damage, nausea/vomiting and severe allergy.  The patient understands that this medication is best absorbed when taken with a fatty meal (e.g., ice cream or french fries).
High Dose Vitamin A Pregnancy And Lactation Text: High dose vitamin A therapy is contraindicated during pregnancy and breast feeding.
Hydroxychloroquine Pregnancy And Lactation Text: This medication has been shown to cause fetal harm but it isn't assigned a Pregnancy Risk Category. There are small amounts excreted in breast milk.
Glycopyrrolate Pregnancy And Lactation Text: This medication is Pregnancy Category B and is considered safe during pregnancy. It is unknown if it is excreted breast milk.
Cyclophosphamide Pregnancy And Lactation Text: This medication is Pregnancy Category D and it isn't considered safe during pregnancy. This medication is excreted in breast milk.
Cibinqo Pregnancy And Lactation Text: It is unknown if this medication will adversely affect pregnancy or breast feeding.  You should not take this medication if you are currently pregnant or planning a pregnancy or while breastfeeding.
Oral Minoxidil Counseling- I discussed with the patient the risks of oral minoxidil including but not limited to shortness of breath, swelling of the feet or ankles, dizziness, lightheadedness, unwanted hair growth and allergic reaction.  The patient verbalized understanding of the proper use and possible adverse effects of oral minoxidil.  All of the patient's questions and concerns were addressed.
Imiquimod Counseling:  I discussed with the patient the risks of imiquimod including but not limited to erythema, scaling, itching, weeping, crusting, and pain.  Patient understands that the inflammatory response to imiquimod is variable from person to person and was educated regarded proper titration schedule.  If flu-like symptoms develop, patient knows to discontinue the medication and contact us.
Rifampin Counseling: I discussed with the patient the risks of rifampin including but not limited to liver damage, kidney damage, red-orange body fluids, nausea/vomiting and severe allergy.
Sski Pregnancy And Lactation Text: This medication is Pregnancy Category D and isn't considered safe during pregnancy. It is excreted in breast milk.
Doxepin Pregnancy And Lactation Text: This medication is Pregnancy Category C and it isn't known if it is safe during pregnancy. It is also excreted in breast milk and breast feeding isn't recommended.
Vtama Pregnancy And Lactation Text: It is unknown if this medication can cause problems during pregnancy and breastfeeding.
Topical Ketoconazole Counseling: Patient counseled that this medication may cause skin irritation or allergic reactions.  In the event of skin irritation, the patient was advised to reduce the amount of the drug applied or use it less frequently.   The patient verbalized understanding of the proper use and possible adverse effects of ketoconazole.  All of the patient's questions and concerns were addressed.
Bactrim Pregnancy And Lactation Text: This medication is Pregnancy Category D and is known to cause fetal risk.  It is also excreted in breast milk.
Enbrel Counseling:  I discussed with the patient the risks of etanercept including but not limited to myelosuppression, immunosuppression, autoimmune hepatitis, demyelinating diseases, lymphoma, and infections.  The patient understands that monitoring is required including a PPD at baseline and must alert us or the primary physician if symptoms of infection or other concerning signs are noted.
Tremfya Counseling: I discussed with the patient the risks of guselkumab including but not limited to immunosuppression, serious infections, and drug reactions.  The patient understands that monitoring is required including a PPD at baseline and must alert us or the primary physician if symptoms of infection or other concerning signs are noted.
Drysol Counseling:  I discussed with the patient the risks of drysol/aluminum chloride including but not limited to skin rash, itching, irritation, burning.
Low Dose Naltrexone Counseling- I discussed with the patient the potential risks and side effects of low dose naltrexone including but not limited to: more vivid dreams, headaches, nausea, vomiting, abdominal pain, fatigue, dizziness, and anxiety.
Opzelura Pregnancy And Lactation Text: There is insufficient data to evaluate drug-associated risk for major birth defects, miscarriage, or other adverse maternal or fetal outcomes.  There is a pregnancy registry that monitors pregnancy outcomes in pregnant persons exposed to the medication during pregnancy.  It is unknown if this medication is excreted in breast milk.  Do not breastfeed during treatment and for about 4 weeks after the last dose.
Spironolactone Counseling: Patient advised regarding risks of diarrhea, abdominal pain, hyperkalemia, birth defects (for female patients), liver toxicity and renal toxicity. The patient may need blood work to monitor liver and kidney function and potassium levels while on therapy. The patient verbalized understanding of the proper use and possible adverse effects of spironolactone.  All of the patient's questions and concerns were addressed.
Rituxan Pregnancy And Lactation Text: This medication is Pregnancy Category C and it isn't know if it is safe during pregnancy. It is unknown if this medication is excreted in breast milk but similar antibodies are known to be excreted.
Odomzo Counseling- I discussed with the patient the risks of Odomzo including but not limited to nausea, vomiting, diarrhea, constipation, weight loss, changes in the sense of taste, decreased appetite, muscle spasms, and hair loss.  The patient verbalized understanding of the proper use and possible adverse effects of Odomzo.  All of the patient's questions and concerns were addressed.
Taltz Counseling: I discussed with the patient the risks of ixekizumab including but not limited to immunosuppression, serious infections, worsening of inflammatory bowel disease and drug reactions.  The patient understands that monitoring is required including a PPD at baseline and must alert us or the primary physician if symptoms of infection or other concerning signs are noted.
Erythromycin Counseling:  I discussed with the patient the risks of erythromycin including but not limited to GI upset, allergic reaction, drug rash, diarrhea, increase in liver enzymes, and yeast infections.
Cyclosporine Counseling:  I discussed with the patient the risks of cyclosporine including but not limited to hypertension, gingival hyperplasia,myelosuppression, immunosuppression, liver damage, kidney damage, neurotoxicity, lymphoma, and serious infections. The patient understands that monitoring is required including baseline blood pressure, CBC, CMP, lipid panel and uric acid, and then 1-2 times monthly CMP and blood pressure.
Hydroxychloroquine Counseling:  I discussed with the patient that a baseline ophthalmologic exam is needed at the start of therapy and every year thereafter while on therapy. A CBC may also be warranted for monitoring.  The side effects of this medication were discussed with the patient, including but not limited to agranulocytosis, aplastic anemia, seizures, rashes, retinopathy, and liver toxicity. Patient instructed to call the office should any adverse effect occur.  The patient verbalized understanding of the proper use and possible adverse effects of Plaquenil.  All the patient's questions and concerns were addressed.
Oral Minoxidil Pregnancy And Lactation Text: This medication should only be used when clearly needed if you are pregnant, attempting to become pregnant or breast feeding.
Acitretin Counseling:  I discussed with the patient the risks of acitretin including but not limited to hair loss, dry lips/skin/eyes, liver damage, hyperlipidemia, depression/suicidal ideation, photosensitivity.  Serious rare side effects can include but are not limited to pancreatitis, pseudotumor cerebri, bony changes, clot formation/stroke/heart attack.  Patient understands that alcohol is contraindicated since it can result in liver toxicity and significantly prolong the elimination of the drug by many years.
Olumiant Counseling: I discussed with the patient the risks of Olumiant therapy including but not limited to upper respiratory tract infections, shingles, cold sores, and nausea. Live vaccines should be avoided.  This medication has been linked to serious infections; higher rate of mortality; malignancy and lymphoproliferative disorders; major adverse cardiovascular events; thrombosis; gastrointestinal perforations; neutropenia; lymphopenia; anemia; liver enzyme elevations; and lipid elevations.
Solaraze Counseling:  I discussed with the patient the risks of Solaraze including but not limited to erythema, scaling, itching, weeping, crusting, and pain.
Rifampin Pregnancy And Lactation Text: This medication is Pregnancy Category C and it isn't know if it is safe during pregnancy. It is also excreted in breast milk and should not be used if you are breast feeding.
Hydroxyzine Counseling: Patient advised that the medication is sedating and not to drive a car after taking this medication.  Patient informed of potential adverse effects including but not limited to dry mouth, urinary retention, and blurry vision.  The patient verbalized understanding of the proper use and possible adverse effects of hydroxyzine.  All of the patient's questions and concerns were addressed.
Adbry Counseling: I discussed with the patient the risks of tralokinumab including but not limited to eye infection and irritation, cold sores, injection site reactions, worsening of asthma, allergic reactions and increased risk of parasitic infection.  Live vaccines should be avoided while taking tralokinumab. The patient understands that monitoring is required and they must alert us or the primary physician if symptoms of infection or other concerning signs are noted.
Aklief counseling:  Patient advised to apply a pea-sized amount only at bedtime and wait 30 minutes after washing their face before applying.  If too drying, patient may add a non-comedogenic moisturizer.  The most commonly reported side effects including irritation, redness, scaling, dryness, stinging, burning, itching, and increased risk of sunburn.  The patient verbalized understanding of the proper use and possible adverse effects of retinoids.  All of the patient's questions and concerns were addressed.
Zyclara Counseling:  I discussed with the patient the risks of imiquimod including but not limited to erythema, scaling, itching, weeping, crusting, and pain.  Patient understands that the inflammatory response to imiquimod is variable from person to person and was educated regarded proper titration schedule.  If flu-like symptoms develop, patient knows to discontinue the medication and contact us.
Thalidomide Counseling: I discussed with the patient the risks of thalidomide including but not limited to birth defects, anxiety, weakness, chest pain, dizziness, cough and severe allergy.
Cephalexin Counseling: I counseled the patient regarding use of cephalexin as an antibiotic for prophylactic and/or therapeutic purposes. Cephalexin (commonly prescribed under brand name Keflex) is a cephalosporin antibiotic which is active against numerous classes of bacteria, including most skin bacteria. Side effects may include nausea, diarrhea, gastrointestinal upset, rash, hives, yeast infections, and in rare cases, hepatitis, kidney disease, seizures, fever, confusion, neurologic symptoms, and others. Patients with severe allergies to penicillin medications are cautioned that there is about a 10% incidence of cross-reactivity with cephalosporins. When possible, patients with penicillin allergies should use alternatives to cephalosporins for antibiotic therapy.
Siliq Counseling:  I discussed with the patient the risks of Siliq including but not limited to new or worsening depression, suicidal thoughts and behavior, immunosuppression, malignancy, posterior leukoencephalopathy syndrome, and serious infections.  The patient understands that monitoring is required including a PPD at baseline and must alert us or the primary physician if symptoms of infection or other concerning signs are noted. There is also a special program designed to monitor depression which is required with Siliq.
Ketoconazole Counseling:   Patient counseled regarding improving absorption with orange juice.  Adverse effects include but are not limited to breast enlargement, headache, diarrhea, nausea, upset stomach, liver function test abnormalities, taste disturbance, and stomach pain.  There is a rare possibility of liver failure that can occur when taking ketoconazole. The patient understands that monitoring of LFTs may be required, especially at baseline. The patient verbalized understanding of the proper use and possible adverse effects of ketoconazole.  All of the patient's questions and concerns were addressed.
Colchicine Counseling:  Patient counseled regarding adverse effects including but not limited to stomach upset (nausea, vomiting, stomach pain, or diarrhea).  Patient instructed to limit alcohol consumption while taking this medication.  Colchicine may reduce blood counts especially with prolonged use.  The patient understands that monitoring of kidney function and blood counts may be required, especially at baseline. The patient verbalized understanding of the proper use and possible adverse effects of colchicine.  All of the patient's questions and concerns were addressed.
Low Dose Naltrexone Pregnancy And Lactation Text: Naltrexone is pregnancy category C.  There have been no adequate and well-controlled studies in pregnant women.  It should be used in pregnancy only if the potential benefit justifies the potential risk to the fetus.   Limited data indicates that naltrexone is minimally excreted into breastmilk.
Picato Counseling:  I discussed with the patient the risks of Picato including but not limited to erythema, scaling, itching, weeping, crusting, and pain.
Spironolactone Pregnancy And Lactation Text: This medication can cause feminization of the male fetus and should be avoided during pregnancy. The active metabolite is also found in breast milk.
Olumiant Pregnancy And Lactation Text: Based on animal studies, Olumiant may cause embryo-fetal harm when administered to pregnant women.  The medication should not be used in pregnancy.  Breastfeeding is not recommended during treatment.
Erythromycin Pregnancy And Lactation Text: This medication is Pregnancy Category B and is considered safe during pregnancy. It is also excreted in breast milk.
Calcipotriene Pregnancy And Lactation Text: This medication has not been proven safe during pregnancy. It is unknown if this medication is excreted in breast milk.
Topical Sulfur Applications Counseling: Topical Sulfur Counseling: Patient counseled that this medication may cause skin irritation or allergic reactions.  In the event of skin irritation, the patient was advised to reduce the amount of the drug applied or use it less frequently.   The patient verbalized understanding of the proper use and possible adverse effects of topical sulfur application.  All of the patient's questions and concerns were addressed.
Otezla Counseling: The side effects of Otezla were discussed with the patient, including but not limited to worsening or new depression, weight loss, diarrhea, nausea, upper respiratory tract infection, and headache. Patient instructed to call the office should any adverse effect occur.  The patient verbalized understanding of the proper use and possible adverse effects of Otezla.  All the patient's questions and concerns were addressed.
Sarecycline Counseling: Patient advised regarding possible photosensitivity and discoloration of the teeth, skin, lips, tongue and gums.  Patient instructed to avoid sunlight, if possible.  When exposed to sunlight, patients should wear protective clothing, sunglasses, and sunscreen.  The patient was instructed to call the office immediately if the following severe adverse effects occur:  hearing changes, easy bruising/bleeding, severe headache, or vision changes.  The patient verbalized understanding of the proper use and possible adverse effects of sarecycline.  All of the patient's questions and concerns were addressed.
Solaraze Pregnancy And Lactation Text: This medication is Pregnancy Category B and is considered safe. There is some data to suggest avoiding during the third trimester. It is unknown if this medication is excreted in breast milk.
Klisyri Counseling:  I discussed with the patient the risks of Klisyri including but not limited to erythema, scaling, itching, weeping, crusting, and pain.
Acitretin Pregnancy And Lactation Text: This medication is Pregnancy Category X and should not be given to women who are pregnant or may become pregnant in the future. This medication is excreted in breast milk.
Adbry Pregnancy And Lactation Text: It is unknown if this medication will adversely affect pregnancy or breast feeding.
Hydroxyzine Pregnancy And Lactation Text: This medication is not safe during pregnancy and should not be taken. It is also excreted in breast milk and breast feeding isn't recommended.
Humira Counseling:  I discussed with the patient the risks of adalimumab including but not limited to myelosuppression, immunosuppression, autoimmune hepatitis, demyelinating diseases, lymphoma, and serious infections.  The patient understands that monitoring is required including a PPD at baseline and must alert us or the primary physician if symptoms of infection or other concerning signs are noted.
Cephalexin Pregnancy And Lactation Text: This medication is Pregnancy Category B and considered safe during pregnancy.  It is also excreted in breast milk but can be used safely for shorter doses.
Aklief Pregnancy And Lactation Text: It is unknown if this medication is safe to use during pregnancy.  It is unknown if this medication is excreted in breast milk.  Breastfeeding women should use the topical cream on the smallest area of the skin for the shortest time needed while breastfeeding.  Do not apply to nipple and areola.
Elidel Counseling: Patient may experience a mild burning sensation during topical application. Elidel is not approved in children less than 2 years of age. There have been case reports of hematologic and skin malignancies in patients using topical calcineurin inhibitors although causality is questionable.
Ketoconazole Pregnancy And Lactation Text: This medication is Pregnancy Category C and it isn't know if it is safe during pregnancy. It is also excreted in breast milk and breast feeding isn't recommended.
Niacinamide Counseling: I recommended taking niacin or niacinamide, also know as vitamin B3, twice daily. Recent evidence suggests that taking vitamin B3 (500 mg twice daily) can reduce the risk of actinic keratoses and non-melanoma skin cancers. Side effects of vitamin B3 include flushing and headache.
Xolair Counseling:  Patient informed of potential adverse effects including but not limited to fever, muscle aches, rash and allergic reactions.  The patient verbalized understanding of the proper use and possible adverse effects of Xolair.  All of the patient's questions and concerns were addressed.

## 2022-12-07 ENCOUNTER — RX ONLY (OUTPATIENT)
Age: 57
Setting detail: RX ONLY
End: 2022-12-07

## 2022-12-07 RX ORDER — DUPILUMAB 300 MG/2ML
INJECTION, SOLUTION SUBCUTANEOUS
Qty: 1 | Refills: 0 | Status: ERX

## 2022-12-07 RX ORDER — DUPILUMAB 300 MG/2ML
INJECTION, SOLUTION SUBCUTANEOUS
Qty: 1 | Refills: 0 | Status: ERX | COMMUNITY
Start: 2022-12-07

## 2022-12-16 ENCOUNTER — APPOINTMENT (RX ONLY)
Dept: URBAN - METROPOLITAN AREA CLINIC 76 | Facility: CLINIC | Age: 57
Setting detail: DERMATOLOGY
End: 2022-12-16

## 2022-12-16 VITALS — SYSTOLIC BLOOD PRESSURE: 134 MMHG | DIASTOLIC BLOOD PRESSURE: 81 MMHG

## 2022-12-16 DIAGNOSIS — L20.89 OTHER ATOPIC DERMATITIS: ICD-10-CM

## 2022-12-16 PROBLEM — L20.84 INTRINSIC (ALLERGIC) ECZEMA: Status: ACTIVE | Noted: 2022-12-16

## 2022-12-16 PROCEDURE — ? TREATMENT REGIMEN

## 2022-12-16 PROCEDURE — ? BIOLOGIC INJECTION TRAINING

## 2022-12-16 PROCEDURE — ? COUNSELING

## 2022-12-16 PROCEDURE — ? MEDICATION COUNSELING

## 2022-12-16 ASSESSMENT — LOCATION DETAILED DESCRIPTION DERM
LOCATION DETAILED: PERIUMBILICAL SKIN
LOCATION DETAILED: RIGHT VENTRAL PROXIMAL FOREARM
LOCATION DETAILED: LEFT ANTERIOR PROXIMAL THIGH
LOCATION DETAILED: RIGHT ANTERIOR PROXIMAL THIGH
LOCATION DETAILED: RIGHT PROXIMAL DORSAL FOREARM
LOCATION DETAILED: LEFT PROXIMAL DORSAL FOREARM
LOCATION DETAILED: LEFT VENTRAL PROXIMAL FOREARM
LOCATION DETAILED: RIGHT PROXIMAL PRETIBIAL REGION
LOCATION DETAILED: LEFT MEDIAL UPPER BACK
LOCATION DETAILED: LEFT PROXIMAL PRETIBIAL REGION

## 2022-12-16 ASSESSMENT — LOCATION SIMPLE DESCRIPTION DERM
LOCATION SIMPLE: LEFT THIGH
LOCATION SIMPLE: LEFT FOREARM
LOCATION SIMPLE: LEFT UPPER BACK
LOCATION SIMPLE: RIGHT THIGH
LOCATION SIMPLE: RIGHT PRETIBIAL REGION
LOCATION SIMPLE: RIGHT FOREARM
LOCATION SIMPLE: LEFT PRETIBIAL REGION
LOCATION SIMPLE: ABDOMEN

## 2022-12-16 ASSESSMENT — LOCATION ZONE DERM
LOCATION ZONE: LEG
LOCATION ZONE: ARM
LOCATION ZONE: TRUNK

## 2022-12-16 NOTE — PROCEDURE: BIOLOGIC INJECTION TRAINING
Cosentyx Training Text: We discussed Cosentyx injection.  I demonstrated how to clean the skin and administer the medication.
Who Did You Train: The Patient
Siliq Training Text: We discussed Siliq injection.  I demonstrated how to clean the skin and administer the medication.
Skyrizi Training Text: We discussed Skyrizi injection.  I demonstrated how to clean the skin and administer the medication.
Which Biologic Is The Patient Receiving Training For?: Dupixent
Humira Training Text: We discussed Humira injection.  I demonstrated how to clean the skin and administer the medication.
Ilumya Training Text: We discussed Ilumya injection.  I demonstrated how to clean the skin and administer the medication.
Detail Level: Simple
Enbrel Training Text: We discussed Enbrel injection.  I demonstrated how to clean the skin and administer the medication.
Simponi Training Text: We discussed Simponi injection.  I demonstrated how to clean the skin and administer the medication.
Cimzia Training Text: We discussed Cimzia injection.  I demonstrated how to clean the skin and administer the medication.
Tremfya Training Text: We discussed Tremfya injection.  I demonstrated how to clean the skin and administer the medication.
Taltz Training Text: We discussed Taltz injection.  I demonstrated how to clean the skin and administer the medication.
Dupixent Training Text: We discussed Dupixent injection.  I demonstrated how to clean the skin and administer the medication.
Stelara Training Text: We discussed Stelara injection.  I demonstrated how to clean the skin and administer the medication.

## 2023-05-23 ENCOUNTER — APPOINTMENT (RX ONLY)
Dept: URBAN - METROPOLITAN AREA CLINIC 76 | Facility: CLINIC | Age: 58
Setting detail: DERMATOLOGY
End: 2023-05-23

## 2023-05-23 DIAGNOSIS — L20.89 OTHER ATOPIC DERMATITIS: ICD-10-CM | Status: WELL CONTROLLED

## 2023-05-23 DIAGNOSIS — Z71.89 OTHER SPECIFIED COUNSELING: ICD-10-CM

## 2023-05-23 PROBLEM — L20.84 INTRINSIC (ALLERGIC) ECZEMA: Status: ACTIVE | Noted: 2023-05-23

## 2023-05-23 PROCEDURE — 99214 OFFICE O/P EST MOD 30 MIN: CPT

## 2023-05-23 PROCEDURE — ? PRESCRIPTION

## 2023-05-23 PROCEDURE — ? SUNSCREEN RECOMMENDATIONS

## 2023-05-23 PROCEDURE — ? COUNSELING

## 2023-05-23 PROCEDURE — ? MEDICATION COUNSELING

## 2023-05-23 PROCEDURE — ? TREATMENT REGIMEN

## 2023-05-23 RX ORDER — TRIAMCINOLONE ACETONIDE 1 MG/G
CREAM TOPICAL
Qty: 30 | Refills: 1 | Status: ERX

## 2023-05-23 ASSESSMENT — SEVERITY ASSESSMENT 2020: SEVERITY 2020: ALMOST CLEAR

## 2023-05-23 ASSESSMENT — LOCATION ZONE DERM
LOCATION ZONE: ARM
LOCATION ZONE: LEG

## 2023-05-23 ASSESSMENT — LOCATION DETAILED DESCRIPTION DERM
LOCATION DETAILED: RIGHT PROXIMAL PRETIBIAL REGION
LOCATION DETAILED: LEFT PROXIMAL PRETIBIAL REGION
LOCATION DETAILED: LEFT VENTRAL PROXIMAL FOREARM
LOCATION DETAILED: RIGHT VENTRAL PROXIMAL FOREARM

## 2023-05-23 ASSESSMENT — LOCATION SIMPLE DESCRIPTION DERM
LOCATION SIMPLE: LEFT FOREARM
LOCATION SIMPLE: RIGHT PRETIBIAL REGION
LOCATION SIMPLE: LEFT PRETIBIAL REGION
LOCATION SIMPLE: RIGHT FOREARM

## 2023-05-23 ASSESSMENT — BSA RASH: BSA RASH: 4

## 2023-05-23 NOTE — PROCEDURE: TREATMENT REGIMEN
Initiate Treatment: TAC .1% to areas of eczema on body twice daily as needed
Continue Regimen: Dupixent
Detail Level: Detailed

## 2023-08-10 ENCOUNTER — APPOINTMENT (RX ONLY)
Dept: URBAN - METROPOLITAN AREA CLINIC 76 | Facility: CLINIC | Age: 58
Setting detail: DERMATOLOGY
End: 2023-08-10

## 2023-08-10 DIAGNOSIS — L259 CONTACT DERMATITIS AND OTHER ECZEMA, UNSPECIFIED CAUSE: ICD-10-CM

## 2023-08-10 PROBLEM — L23.9 ALLERGIC CONTACT DERMATITIS, UNSPECIFIED CAUSE: Status: ACTIVE | Noted: 2023-08-10

## 2023-08-10 PROCEDURE — ? COUNSELING

## 2023-08-10 PROCEDURE — ? PRESCRIPTION

## 2023-08-10 PROCEDURE — 99213 OFFICE O/P EST LOW 20 MIN: CPT

## 2023-08-10 PROCEDURE — ? TREATMENT REGIMEN

## 2023-08-10 RX ORDER — HYDROXYZINE HYDROCHLORIDE 10 MG/1
TABLET, FILM COATED ORAL
Qty: 60 | Refills: 0 | Status: ERX | COMMUNITY
Start: 2023-08-10

## 2023-08-10 RX ORDER — PREDNISONE 10 MG/1
TABLET ORAL
Qty: 48 | Refills: 0 | Status: ERX

## 2023-08-10 RX ORDER — TRIAMCINOLONE ACETONIDE 1 MG/G
CREAM TOPICAL
Qty: 454 | Refills: 0 | Status: ERX

## 2023-08-10 RX ADMIN — HYDROXYZINE HYDROCHLORIDE: 10 TABLET, FILM COATED ORAL at 00:00

## 2023-08-10 ASSESSMENT — SEVERITY ASSESSMENT: SEVERITY: MODERATE TO SEVERE

## 2023-08-10 ASSESSMENT — BSA RASH: BSA RASH: 50

## 2023-08-10 NOTE — PROCEDURE: TREATMENT REGIMEN
Otc Regimen: Benadryl daily for itching
Initiate Treatment: Prednisone 10mg tablets: Day 1 3 tablets BID, next day 4 tablets once daily x 4 days, then 3 tablets once daily x 4 days, then 2 tablets once daily x 4 days, then 1 tablet once daily x 4 days, then 1/2 tablet once daily x 4 days then stop.\\n\\nTAC 0.1% Cream to affected areas twice daily until clear with wet dressings\\n\\nHydroxyzine 10mg 2 tablets at night
Discontinue Regimen: Dupixent
Detail Level: Generalized
Plan: IOB for patch testing, patient can start patch testing 1 week after finishing course of prednisone.\\n\\npatient takes Percocet as needed for pain, last time he took a dose was 3 weeks ago.\\nPA for patch testing